# Patient Record
Sex: FEMALE | Race: WHITE | NOT HISPANIC OR LATINO | Employment: OTHER | URBAN - METROPOLITAN AREA
[De-identification: names, ages, dates, MRNs, and addresses within clinical notes are randomized per-mention and may not be internally consistent; named-entity substitution may affect disease eponyms.]

---

## 2017-01-18 ENCOUNTER — TRANSCRIBE ORDERS (OUTPATIENT)
Dept: ADMINISTRATIVE | Facility: HOSPITAL | Age: 57
End: 2017-01-18

## 2017-01-18 DIAGNOSIS — Z12.31 VISIT FOR SCREENING MAMMOGRAM: Primary | ICD-10-CM

## 2017-02-01 ENCOUNTER — APPOINTMENT (OUTPATIENT)
Dept: MAMMOGRAPHY | Facility: HOSPITAL | Age: 57
End: 2017-02-01

## 2017-02-01 ENCOUNTER — HOSPITAL ENCOUNTER (OUTPATIENT)
Dept: MAMMOGRAPHY | Facility: HOSPITAL | Age: 57
Discharge: HOME | End: 2017-02-01
Admitting: PHYSICIAN ASSISTANT

## 2017-02-01 DIAGNOSIS — Z12.31 VISIT FOR SCREENING MAMMOGRAM: ICD-10-CM

## 2017-02-01 PROCEDURE — G0202 SCR MAMMO BI INCL CAD: HCPCS

## 2017-02-01 PROCEDURE — 77063 BREAST TOMOSYNTHESIS BI: CPT

## 2017-02-01 PROCEDURE — 77067 SCR MAMMO BI INCL CAD: CPT | Performed by: RADIOLOGY

## 2017-02-01 PROCEDURE — 77063 BREAST TOMOSYNTHESIS BI: CPT | Performed by: RADIOLOGY

## 2017-02-08 ENCOUNTER — HOSPITAL ENCOUNTER (OUTPATIENT)
Dept: MAMMOGRAPHY | Facility: HOSPITAL | Age: 57
Discharge: HOME | End: 2017-02-08
Admitting: PHYSICIAN ASSISTANT

## 2017-02-08 DIAGNOSIS — R92.8 ABNORMAL MAMMOGRAM: ICD-10-CM

## 2017-02-08 PROCEDURE — 77065 DX MAMMO INCL CAD UNI: CPT | Performed by: RADIOLOGY

## 2017-02-08 PROCEDURE — G0279 TOMOSYNTHESIS, MAMMO: HCPCS

## 2017-02-08 PROCEDURE — 77061 BREAST TOMOSYNTHESIS UNI: CPT | Performed by: RADIOLOGY

## 2017-02-08 PROCEDURE — G0206 DX MAMMO INCL CAD UNI: HCPCS

## 2017-03-31 ENCOUNTER — OFFICE VISIT (OUTPATIENT)
Dept: CARDIOLOGY | Facility: CLINIC | Age: 57
End: 2017-03-31

## 2017-03-31 VITALS
DIASTOLIC BLOOD PRESSURE: 78 MMHG | SYSTOLIC BLOOD PRESSURE: 130 MMHG | WEIGHT: 234.2 LBS | HEART RATE: 96 BPM | HEIGHT: 63 IN | BODY MASS INDEX: 41.5 KG/M2

## 2017-03-31 DIAGNOSIS — E66.01 MORBID OBESITY DUE TO EXCESS CALORIES (HCC): ICD-10-CM

## 2017-03-31 DIAGNOSIS — R60.0 EDEMA OF LEFT LOWER EXTREMITY: ICD-10-CM

## 2017-03-31 DIAGNOSIS — I10 ESSENTIAL HYPERTENSION: ICD-10-CM

## 2017-03-31 DIAGNOSIS — I11.9 HYPERTENSIVE HEART DISEASE WITHOUT HEART FAILURE: Primary | ICD-10-CM

## 2017-03-31 PROCEDURE — 99213 OFFICE O/P EST LOW 20 MIN: CPT | Performed by: INTERNAL MEDICINE

## 2017-03-31 RX ORDER — CARVEDILOL 3.12 MG/1
3.12 TABLET ORAL NIGHTLY
COMMUNITY
End: 2017-06-05 | Stop reason: SDUPTHER

## 2017-03-31 NOTE — PROGRESS NOTES
Encounter Date:03/31/2017      Patient ID: Aleyda Louise is a 56 y.o. female.    Chief Complaint: No chief complaint on file.      PROBLEM LIST:  1. NYHA class II exertional dyspnea:   a. Echocardiogram 02/04/2011:  Borderline LVH with EF greater than 55%. Normal valvular morphology.  b. GXT myocardial perfusion study, 04/20/2000. No reversible ischemia. Poor exercise capacity. Normal blood pressure response to exercise. No EKG changes.   c. TTE 5/25/2016EFR 60-65%. MIld MR. Mild TR. RVSP 49. Trace [place].  2.  Morbid obesity, BMI 41.5.   3. Asthma.   4. Chronic lower extremity edema.   5. Hypothyroid.   6. GERD with Schatzki's ring.   7. Surgical history:  a.  Cholecystectomy.   b. 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.    History of Present Illness  Patient presents today for follow-up.  Since last visit ***. Denies chest pain, shortness of breath, leg swelling, palpitations, and syncope. Remains busy and active with ***.    Allergies   Allergen Reactions   • Hydrochlorothiazide      caused a 30-pound weight loss over 3-4 days     • Levaquin [Levofloxacin]    • Other      ALL DIURETICS, cause cramping.         Current Outpatient Prescriptions:   •  ARMOUR THYROID 180 MG tablet, daily., Disp: , Rfl: 11  •  carvedilol (COREG) 3.125 MG tablet, Take 3.125 mg by mouth Every Night., Disp: , Rfl:   •  omeprazole (PriLOSEC) 20 MG capsule, Take 20 mg by mouth daily., Disp: , Rfl:   •  pramipexole (MIRAPEX) 1 MG tablet, Take 2 mg by mouth Every Night., Disp: , Rfl: 3  •  tiZANidine (ZANAFLEX) 4 MG tablet, Take 4 mg by mouth As Needed., Disp: , Rfl:   •  traMADol (ULTRAM) 50 MG tablet, Take 50 mg by mouth as needed for moderate pain (4-6)., Disp: , Rfl:     The following portions of the patient's history were reviewed and updated as appropriate: allergies, current medications, past family history, past medical history, past social history, past surgical history and problem list.    Review of Systems  "  Constitution: Negative for chills, fever, weight gain and weight loss.   Cardiovascular: Negative for chest pain, claudication, dyspnea on exertion, leg swelling, orthopnea, palpitations, paroxysmal nocturnal dyspnea and syncope.        No dizziness   Gastrointestinal: Negative for abdominal pain, constipation, diarrhea, nausea and vomiting.   Genitourinary:        No urinary symptoms   Neurological:        No symptoms of stroke.   All other systems reviewed and are negative.         Objective:     Blood pressure 130/78, pulse 96, height 63\" (160 cm), weight 234 lb 3.2 oz (106 kg).  Repeat blood pressure measurement by, Kinsey Owen MD, at ***      Physical Exam   Constitutional: She appears well-developed and well-nourished.   HENT:   HEENT exam unremarkable.   Neck: Neck supple. No JVD present.   No carotid bruits.   Cardiovascular: Normal rate, regular rhythm and normal heart sounds.    No murmur heard.  2 plus symmetric pulses.   Pulmonary/Chest: Breath sounds normal. She exhibits no tenderness.   Abdominal:   Abdomen benign.   Musculoskeletal: She exhibits no edema.   Neurological:   Neurological exam unremarkable.   Vitals reviewed.      Lab Review:   Procedures       Assessment:   No diagnosis found.  Plan:     Continue current medications.   FU in 6 MO, sooner as needed.  Thank you for allowing us to participate in the care of your patient.     Scribed for Kinsey Owen MD by Tim Whitehead. 3/31/2017  12:33 PM    ***    Please note that portions of this note may have been completed with a voice recognition program. Efforts were made to edit the dictations, but occasionally words are mistranscribed.        "

## 2017-03-31 NOTE — PROGRESS NOTES
Encounter Date:03/31/2017      Patient ID: Aleyda Louise is a 56 y.o. female.    Chief Complaint: follow up Hypertensive heart disease    Problem List:  1. NYHA class II exertional dyspnea:   a. Echocardiogram 02/04/2011: Borderline LVH with EF greater than 55%. Normal valvular morphology.  b. GXT myocardial perfusion study, 04/20/2000. No reversible ischemia. Poor exercise capacity. Normal blood pressure response to exercise. No EKG changes.   c. TTE 5/25/2016 EFR 60-65%. MIld MR. Mild TR. RVSP 49. Trace [place].  2. Morbid obesity, BMI 41.5.   3. Asthma.   4. Chronic lower extremity edema.   5. Hypothyroid.   6. GERD with Schatzki's ring.   7. Surgical history:  a. Cholecystectomy.   b. 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    History of Present Illness   Mrs. Louise is a 56 yr old white female with the above noted medical history who presents for follow up of her hypertensive heart disease.  She has been trying to diet,but has been unsuccessful.   She had labs drawn in February and her TSH was high so she went off of her diet and is scheduled to have her labs rechecked next week.  She continues to be short of breath with minimal exertion. She denies chest pain, palpitations, orthopnea, PND.  She does have lower extremity edema mainly of the left leg.    Allergies   Allergen Reactions   • Hydrochlorothiazide      caused a 30-pound weight loss over 3-4 days     • Levaquin [Levofloxacin]    • Other      ALL DIURETICS, cause cramping.         Current Outpatient Prescriptions:   •  ARMOUR THYROID 180 MG tablet, daily., Disp: , Rfl: 11  •  carvedilol (COREG) 3.125 MG tablet, Take 3.125 mg by mouth Every Night., Disp: , Rfl:   •  omeprazole (PriLOSEC) 20 MG capsule, Take 20 mg by mouth daily., Disp: , Rfl:   •  pramipexole (MIRAPEX) 1 MG tablet, Take 2 mg by mouth Every Night., Disp: , Rfl: 3  •  tiZANidine (ZANAFLEX) 4 MG tablet, Take 4 mg by mouth As Needed., Disp: , Rfl:   •  traMADol (ULTRAM) 50 MG  "tablet, Take 50 mg by mouth as needed for moderate pain (4-6)., Disp: , Rfl:     The following portions of the patient's history were reviewed and updated as appropriate: allergies, current medications, past family history, past medical history, past social history, past surgical history and problem list.    Review of Systems   Constitution: Negative.   HENT: Negative.    Eyes: Negative.    Cardiovascular: Positive for dyspnea on exertion and leg swelling. Negative for chest pain, near-syncope, orthopnea, palpitations, paroxysmal nocturnal dyspnea and syncope.   Respiratory: Positive for shortness of breath.    Endocrine: Negative.    Hematologic/Lymphatic: Negative.    Skin: Negative.    Musculoskeletal: Positive for arthritis and joint pain.   Gastrointestinal: Negative.    Genitourinary: Negative.    Neurological: Negative.    Psychiatric/Behavioral: Negative.    Allergic/Immunologic: Negative.        Objective:     Blood pressure 130/78, pulse 96, height 63\" (160 cm), weight 234 lb 3.2 oz (106 kg).      Physical Exam   Constitutional: She is oriented to person, place, and time. She appears well-developed and well-nourished.   HENT:   Head: Normocephalic and atraumatic.   Neck: Normal range of motion. No thyromegaly present.   Cardiovascular: Normal rate, regular rhythm, normal heart sounds and intact distal pulses.  Exam reveals no gallop and no friction rub.    No murmur heard.  Pulmonary/Chest: Effort normal and breath sounds normal.   Abdominal: Soft. Bowel sounds are normal.   Musculoskeletal: Normal range of motion. She exhibits edema.   LLE edema   Neurological: She is alert and oriented to person, place, and time.   Skin: Skin is warm and dry.   Psychiatric: She has a normal mood and affect. Her behavior is normal. Judgment and thought content normal.   Nursing note and vitals reviewed.      Lab Review:     Procedures         Assessment:          Diagnosis Plan   1. Hypertensive heart disease without " heart failure     2. Essential hypertension     3. Morbid obesity due to excess calories     4. Edema of left lower extremity            Plan:   Compression Hose for treatment of lower extremity edema  Referral to Dr. Leyva for vein evaluation or return to see Dr. De Dios, she will side and let us know.  Continue Coreg for hypertension.   Patient is cleared from a cardiac standpoint to proceed with Bariatric Surgery is she decides to proceed.  FU in 12 MO, sooner as needed.  Thank you for allowing us to participate in the care of your patient.     Scribed for Kinsey Owen MD by Jocelyn Dunbar RN. 3/31/2017  1:02 PM     I, Kinsey Owen MD, personally performed the services described in this documentation as scribed by the above named individual in my presence, and it is both accurate and complete.  3/31/2017  1:20 PM

## 2017-04-11 ENCOUNTER — HOSPITAL ENCOUNTER (OUTPATIENT)
Dept: GENERAL RADIOLOGY | Facility: HOSPITAL | Age: 57
Discharge: HOME | End: 2017-04-11
Admitting: NURSE PRACTITIONER

## 2017-04-11 ENCOUNTER — TRANSCRIBE ORDERS (OUTPATIENT)
Dept: ADMINISTRATIVE | Facility: HOSPITAL | Age: 57
End: 2017-04-11

## 2017-04-11 DIAGNOSIS — R05.9 COUGH: Primary | ICD-10-CM

## 2017-04-11 PROCEDURE — 71020 HC CHEST PA AND LATERAL: CPT

## 2017-06-05 RX ORDER — CARVEDILOL 3.12 MG/1
3.12 TABLET ORAL NIGHTLY
Qty: 90 TABLET | Refills: 2 | Status: SHIPPED | OUTPATIENT
Start: 2017-06-05 | End: 2019-06-27

## 2017-09-11 ENCOUNTER — TELEPHONE (OUTPATIENT)
Dept: PAIN MEDICINE | Facility: CLINIC | Age: 57
End: 2017-09-11

## 2017-09-20 ENCOUNTER — TELEPHONE (OUTPATIENT)
Dept: PAIN MEDICINE | Facility: CLINIC | Age: 57
End: 2017-09-20

## 2019-05-23 RX ORDER — THYROID 180 MG
TABLET ORAL
Qty: 30 TABLET | Refills: 0 | Status: SHIPPED | OUTPATIENT
Start: 2019-05-23 | End: 2019-06-15 | Stop reason: SDUPTHER

## 2019-06-17 RX ORDER — THYROID 180 MG
TABLET ORAL
Qty: 30 TABLET | Refills: 0 | Status: SHIPPED | OUTPATIENT
Start: 2019-06-17 | End: 2019-07-03 | Stop reason: ALTCHOICE

## 2019-06-24 RX ORDER — PRAMIPEXOLE DIHYDROCHLORIDE 1 MG/1
TABLET ORAL
Qty: 180 TABLET | Refills: 2 | Status: CANCELLED | OUTPATIENT
Start: 2019-06-24

## 2019-06-25 NOTE — TELEPHONE ENCOUNTER
Electronic refill request for pramipexole.  Patient's last visit with us was 9/12/19. No future appt scheduled.  Please sign off if OK to refill.

## 2019-06-27 ENCOUNTER — OFFICE VISIT (OUTPATIENT)
Dept: INTERNAL MEDICINE | Facility: CLINIC | Age: 59
End: 2019-06-27

## 2019-06-27 ENCOUNTER — HOSPITAL ENCOUNTER (OUTPATIENT)
Dept: CT IMAGING | Facility: HOSPITAL | Age: 59
Discharge: HOME | End: 2019-06-27
Admitting: PHYSICIAN ASSISTANT

## 2019-06-27 ENCOUNTER — LAB (OUTPATIENT)
Dept: LAB | Facility: HOSPITAL | Age: 59
End: 2019-06-27

## 2019-06-27 VITALS
BODY MASS INDEX: 34.04 KG/M2 | HEIGHT: 62 IN | HEART RATE: 60 BPM | DIASTOLIC BLOOD PRESSURE: 78 MMHG | WEIGHT: 185 LBS | TEMPERATURE: 97.4 F | SYSTOLIC BLOOD PRESSURE: 122 MMHG

## 2019-06-27 DIAGNOSIS — M54.50 ACUTE BILATERAL LOW BACK PAIN WITHOUT SCIATICA: Primary | ICD-10-CM

## 2019-06-27 DIAGNOSIS — E03.8 OTHER SPECIFIED HYPOTHYROIDISM: ICD-10-CM

## 2019-06-27 DIAGNOSIS — M54.50 ACUTE LEFT-SIDED LOW BACK PAIN WITHOUT SCIATICA: ICD-10-CM

## 2019-06-27 DIAGNOSIS — M54.50 ACUTE BILATERAL LOW BACK PAIN WITHOUT SCIATICA: ICD-10-CM

## 2019-06-27 PROBLEM — J20.9 BRONCHITIS WITH BRONCHOSPASM: Status: ACTIVE | Noted: 2019-06-27

## 2019-06-27 PROBLEM — J30.9 ALLERGIC RHINITIS: Status: ACTIVE | Noted: 2019-06-27

## 2019-06-27 PROBLEM — R63.4 WEIGHT LOSS: Status: ACTIVE | Noted: 2019-06-27

## 2019-06-27 PROBLEM — E78.2 MIXED HYPERLIPIDEMIA: Status: ACTIVE | Noted: 2019-06-27

## 2019-06-27 PROBLEM — F33.42 RECURRENT MAJOR DEPRESSIVE DISORDER, IN FULL REMISSION: Status: ACTIVE | Noted: 2019-06-27

## 2019-06-27 PROBLEM — R05.9 COUGH: Status: ACTIVE | Noted: 2019-06-27

## 2019-06-27 PROBLEM — G25.81 RESTLESS LEGS SYNDROME: Status: ACTIVE | Noted: 2019-06-27

## 2019-06-27 PROBLEM — F32.A DEPRESSION: Status: ACTIVE | Noted: 2019-06-27

## 2019-06-27 PROBLEM — J20.9 ACUTE BRONCHITIS DUE TO INFECTION: Status: ACTIVE | Noted: 2019-06-27

## 2019-06-27 PROBLEM — G47.9 SLEEP DISTURBANCE: Status: ACTIVE | Noted: 2019-06-27

## 2019-06-27 PROBLEM — I10 BENIGN ESSENTIAL HYPERTENSION: Status: ACTIVE | Noted: 2019-06-27

## 2019-06-27 PROBLEM — R60.9 EDEMA: Status: ACTIVE | Noted: 2019-06-27

## 2019-06-27 LAB
ALBUMIN SERPL-MCNC: 4.2 G/DL (ref 3.5–5.2)
ALBUMIN/GLOB SERPL: 1.6 G/DL
ALP SERPL-CCNC: 95 U/L (ref 39–117)
ALT SERPL W P-5'-P-CCNC: 14 U/L (ref 1–33)
ANION GAP SERPL CALCULATED.3IONS-SCNC: 9.8 MMOL/L (ref 5–15)
AST SERPL-CCNC: 19 U/L (ref 1–32)
BASOPHILS # BLD AUTO: 0.06 10*3/MM3 (ref 0–0.2)
BASOPHILS NFR BLD AUTO: 1 % (ref 0–1.5)
BILIRUB BLD-MCNC: NEGATIVE MG/DL
BILIRUB SERPL-MCNC: 0.5 MG/DL (ref 0.2–1.2)
BUN BLD-MCNC: 21 MG/DL (ref 8–23)
BUN/CREAT SERPL: 23.6 (ref 7–25)
CALCIUM SPEC-SCNC: 9.5 MG/DL (ref 8.6–10.5)
CHLORIDE SERPL-SCNC: 106 MMOL/L (ref 98–107)
CLARITY, POC: CLEAR
CO2 SERPL-SCNC: 27.2 MMOL/L (ref 22–29)
COLOR UR: ABNORMAL
CREAT BLD-MCNC: 0.89 MG/DL (ref 0.57–1)
DEPRECATED RDW RBC AUTO: 44.4 FL (ref 37–54)
EOSINOPHIL # BLD AUTO: 0.37 10*3/MM3 (ref 0–0.4)
EOSINOPHIL NFR BLD AUTO: 6.1 % (ref 0.3–6.2)
ERYTHROCYTE [DISTWIDTH] IN BLOOD BY AUTOMATED COUNT: 12.6 % (ref 12.3–15.4)
GFR SERPL CREATININE-BSD FRML MDRD: 64 ML/MIN/1.73
GLOBULIN UR ELPH-MCNC: 2.6 GM/DL
GLUCOSE BLD-MCNC: 90 MG/DL (ref 65–99)
GLUCOSE UR STRIP-MCNC: NEGATIVE MG/DL
HCT VFR BLD AUTO: 41.7 % (ref 34–46.6)
HGB BLD-MCNC: 13.2 G/DL (ref 12–15.9)
IMM GRANULOCYTES # BLD AUTO: 0.02 10*3/MM3 (ref 0–0.05)
IMM GRANULOCYTES NFR BLD AUTO: 0.3 % (ref 0–0.5)
KETONES UR QL: NEGATIVE
LEUKOCYTE EST, POC: ABNORMAL
LYMPHOCYTES # BLD AUTO: 2.17 10*3/MM3 (ref 0.7–3.1)
LYMPHOCYTES NFR BLD AUTO: 35.5 % (ref 19.6–45.3)
MCH RBC QN AUTO: 30.4 PG (ref 26.6–33)
MCHC RBC AUTO-ENTMCNC: 31.7 G/DL (ref 31.5–35.7)
MCV RBC AUTO: 96.1 FL (ref 79–97)
MONOCYTES # BLD AUTO: 0.6 10*3/MM3 (ref 0.1–0.9)
MONOCYTES NFR BLD AUTO: 9.8 % (ref 5–12)
NEUTROPHILS # BLD AUTO: 2.89 10*3/MM3 (ref 1.7–7)
NEUTROPHILS NFR BLD AUTO: 47.3 % (ref 42.7–76)
NITRITE UR-MCNC: NEGATIVE MG/ML
NRBC BLD AUTO-RTO: 0 /100 WBC (ref 0–0.2)
PH UR: 6 [PH] (ref 5–8)
PLATELET # BLD AUTO: 249 10*3/MM3 (ref 140–450)
PMV BLD AUTO: 9.8 FL (ref 6–12)
POTASSIUM BLD-SCNC: 4.7 MMOL/L (ref 3.5–5.2)
PROT SERPL-MCNC: 6.8 G/DL (ref 6–8.5)
PROT UR STRIP-MCNC: NEGATIVE MG/DL
RBC # BLD AUTO: 4.34 10*6/MM3 (ref 3.77–5.28)
RBC # UR STRIP: NEGATIVE /UL
SODIUM BLD-SCNC: 143 MMOL/L (ref 136–145)
SP GR UR: 1.02 (ref 1–1.03)
T4 FREE SERPL-MCNC: 0.76 NG/DL (ref 0.93–1.7)
TSH SERPL DL<=0.05 MIU/L-ACNC: 6.18 MIU/ML (ref 0.27–4.2)
UROBILINOGEN UR QL: NORMAL
WBC NRBC COR # BLD: 6.11 10*3/MM3 (ref 3.4–10.8)

## 2019-06-27 PROCEDURE — 82565 ASSAY OF CREATININE: CPT

## 2019-06-27 PROCEDURE — 85025 COMPLETE CBC W/AUTO DIFF WBC: CPT

## 2019-06-27 PROCEDURE — 25010000002 IOPAMIDOL 61 % SOLUTION: Performed by: PHYSICIAN ASSISTANT

## 2019-06-27 PROCEDURE — 87086 URINE CULTURE/COLONY COUNT: CPT

## 2019-06-27 PROCEDURE — 74178 CT ABD&PLV WO CNTR FLWD CNTR: CPT

## 2019-06-27 PROCEDURE — 84443 ASSAY THYROID STIM HORMONE: CPT

## 2019-06-27 PROCEDURE — 80053 COMPREHEN METABOLIC PANEL: CPT

## 2019-06-27 PROCEDURE — 84439 ASSAY OF FREE THYROXINE: CPT

## 2019-06-27 PROCEDURE — 99214 OFFICE O/P EST MOD 30 MIN: CPT | Performed by: PHYSICIAN ASSISTANT

## 2019-06-27 PROCEDURE — 81003 URINALYSIS AUTO W/O SCOPE: CPT | Performed by: PHYSICIAN ASSISTANT

## 2019-06-27 RX ORDER — PRAMIPEXOLE DIHYDROCHLORIDE 1 MG/1
2 TABLET ORAL NIGHTLY
Qty: 180 TABLET | Refills: 3 | Status: SHIPPED | OUTPATIENT
Start: 2019-06-27 | End: 2020-07-06

## 2019-06-27 RX ADMIN — IOPAMIDOL 95 ML: 612 INJECTION, SOLUTION INTRAVENOUS at 16:02

## 2019-06-27 NOTE — PROGRESS NOTES
"Patient Care Team:  Jessica Davila PA-C as PCP - General (Internal Medicine)    Chief Complaint::   Chief Complaint   Patient presents with   • Back Pain        Subjective     HPI  Pt was skydiving May 18th and pt landed on her left hip and left back.  2 days later, she went to [facility] and noticed increasingly pain on the left side.  She states that the pain is \"compressed\" and is causing her to walk sideways.  tI has not improved.  There is an increase in pain in the past two days.  She cannot get comfortable at night.  Heat has been effective.  Has not taken advil or tylenol for pain.  She denies pain with urination or change in color.          PMH  The following portions of the patient's history were reviewed and updated as appropriate: allergies, current medications, past family history, past medical history, past social history, past surgical history and problem list.    Review of Systems:   Review of Systems   Constitutional: Positive for activity change. Negative for appetite change, chills, fatigue and fever.   HENT: Negative for congestion, ear pain and sinus pressure.    Respiratory: Negative for cough, chest tightness, shortness of breath and wheezing.    Cardiovascular: Negative for chest pain and palpitations.   Gastrointestinal: Negative for abdominal pain, blood in stool and constipation.   Endocrine: Negative for cold intolerance and heat intolerance.   Genitourinary: Positive for flank pain. Negative for dysuria and urgency.   Musculoskeletal: Positive for arthralgias, back pain and myalgias. Negative for joint swelling.   Skin: Negative for color change.   Allergic/Immunologic: Positive for environmental allergies.   Neurological: Positive for dizziness. Negative for speech difficulty and headache.   Psychiatric/Behavioral: Negative for decreased concentration. The patient is not nervous/anxious.        Vital Signs  Vitals:    06/27/19 0929   BP: 122/78   BP Location: Left arm   Patient " "Position: Sitting   Cuff Size: Large Adult   Pulse: 60   Temp: 97.4 °F (36.3 °C)   TempSrc: Temporal   Weight: 83.9 kg (185 lb)   Height: 158.5 cm (62.4\")   PainSc:   5   PainLoc: Back       Labs  Office Visit on 06/27/2019   Component Date Value Ref Range Status   • Color 06/27/2019 Dark Yellow  Yellow, Straw, Dark Yellow, Rebecca Final   • Clarity, UA 06/27/2019 Clear  Clear Final   • Specific Gravity  06/27/2019 1.020  1.005 - 1.030 Final   • pH, Urine 06/27/2019 6.0  5.0 - 8.0 Final   • Leukocytes 06/27/2019 Trace* Negative Final   • Nitrite, UA 06/27/2019 Negative  Negative Final   • Protein, POC 06/27/2019 Negative  Negative mg/dL Final   • Glucose, UA 06/27/2019 Negative  Negative, 1000 mg/dL (3+) mg/dL Final   • Ketones, UA 06/27/2019 Negative  Negative Final   • Urobilinogen, UA 06/27/2019 Normal  Normal Final   • Bilirubin 06/27/2019 Negative  Negative Final   • Blood, UA 06/27/2019 Negative  Negative Final       Imaging  All imaging:   Imaging Results (all)     None             Current Outpatient Medications:   •  ARMOUR THYROID 180 MG tablet, TAKE 1 TABLET BY MOUTH EVERY DAY, Disp: 30 tablet, Rfl: 0  •  CALCIUM PO, Take 1 tablet by mouth Daily., Disp: , Rfl:   •  Cholecalciferol (VITAMIN D3 PO), Take 1 capsule by mouth Daily., Disp: , Rfl:   •  Multiple Vitamins-Minerals (MULTIVITAMIN ADULT PO), Take 1 tablet by mouth Daily., Disp: , Rfl:   •  pramipexole (MIRAPEX) 1 MG tablet, Take 2 tablets by mouth Every Night., Disp: 180 tablet, Rfl: 3    Physical Exam:    Physical Exam   Constitutional: She appears well-developed and well-nourished.   HENT:   Head: Normocephalic and atraumatic.   Nose: Nose normal.   Mouth/Throat: Oropharynx is clear and moist.   Eyes: Conjunctivae and EOM are normal. Pupils are equal, round, and reactive to light.   Neck: Normal range of motion. Neck supple.   Cardiovascular: Normal rate, regular rhythm, normal heart sounds and intact distal pulses.   Pulmonary/Chest: Effort normal " and breath sounds normal.   Abdominal: Soft. Bowel sounds are normal.   Musculoskeletal: She exhibits tenderness. She exhibits no edema.        Arms:  Nursing note and vitals reviewed.      Procedures        Assessment/Plan   Problem List Items Addressed This Visit        Endocrine    Hypothyroid    Relevant Medications    ARMOUR THYROID 180 MG tablet    Other Relevant Orders    TSH    T4, Free      Other Visit Diagnoses     Acute bilateral low back pain without sciatica    -  Primary    Relevant Orders    POC Urinalysis Dipstick, Automated (Completed)    CT Abdomen Pelvis With & Without Contrast    Urine Culture - Urine, Urine, Clean Catch    Acute left-sided low back pain without sciatica        Relevant Orders    CBC & Differential    Comprehensive Metabolic Panel        Pt does not feel it is related to her back pain.  Concerns for injured kidney.  Return in about 2 weeks (around 7/11/2019), or if symptoms worsen or fail to improve, for Recheck.    Plan of care reviewed with patient at the conclusion of today's visit. Education was provided regarding diagnosis, management, and any prescribed or recommended OTC medications.Patient verbalizes understanding of and agreement with management plan.     Phuong Stringer PA-C

## 2019-06-28 LAB — BACTERIA SPEC AEROBE CULT: NO GROWTH

## 2019-07-01 ENCOUNTER — TELEPHONE (OUTPATIENT)
Dept: INTERNAL MEDICINE | Facility: CLINIC | Age: 59
End: 2019-07-01

## 2019-07-01 NOTE — TELEPHONE ENCOUNTER
READ LETTER BY BRADLEY NARAYAN TO PATIENT REGARDING HER THYROID RESULTS.  SHE SAID THAT SHE MAY NEED TO MAKE ADJUSTMENT ON HER THYROID MEDICATION.    PATIENT SAYS THAT TANIYA CHANGES HER THYROID DOSES.

## 2019-07-02 LAB — CREAT BLDA-MCNC: 0.8 MG/DL (ref 0.6–1.3)

## 2019-07-03 DIAGNOSIS — E03.9 HYPOTHYROIDISM, UNSPECIFIED TYPE: Primary | ICD-10-CM

## 2019-07-03 NOTE — TELEPHONE ENCOUNTER
Call pt, let her know that I increased [place] thyroid dose.  She needs to recheck level in 6 weeks.   I have placed order.

## 2019-07-09 ENCOUNTER — TELEPHONE (OUTPATIENT)
Dept: INTERNAL MEDICINE | Facility: CLINIC | Age: 59
End: 2019-07-09

## 2019-07-09 NOTE — TELEPHONE ENCOUNTER
Reason for Call: THYROID MED    Symptoms: N/A    Onset (when it began):N/.A    Have they tried anything?N/A    Other pertinent info:    PATIENT STATED THAT OUR OFFICE HAD CALLED HER.  I PUT HER PUT ON HOLD TO FIND OUT WHAT THE MESSAGE WAS, BUT SHE HUNG UP BEFORE I GOT BACK TO HER.  I CALLED HER BACK AND TOLD HER THAT  A MESSAGE WAS ON THE PORTAL, BUT THAT SHE COULD CALL BACK AND WE WOULD READ MESSAGE TO HER.

## 2019-08-30 ENCOUNTER — LAB (OUTPATIENT)
Dept: LAB | Facility: HOSPITAL | Age: 59
End: 2019-08-30

## 2019-08-30 ENCOUNTER — TELEPHONE (OUTPATIENT)
Dept: INTERNAL MEDICINE | Facility: CLINIC | Age: 59
End: 2019-08-30

## 2019-08-30 DIAGNOSIS — R30.0 DYSURIA: Primary | ICD-10-CM

## 2019-08-30 DIAGNOSIS — R30.0 DYSURIA: ICD-10-CM

## 2019-08-30 LAB
BACTERIA UR QL AUTO: ABNORMAL /HPF
BILIRUB UR QL STRIP: ABNORMAL
CLARITY UR: ABNORMAL
COLOR UR: ABNORMAL
GLUCOSE UR STRIP-MCNC: NEGATIVE MG/DL
HGB UR QL STRIP.AUTO: ABNORMAL
HYALINE CASTS UR QL AUTO: ABNORMAL /LPF
KETONES UR QL STRIP: NEGATIVE
LEUKOCYTE ESTERASE UR QL STRIP.AUTO: ABNORMAL
NITRITE UR QL STRIP: POSITIVE
PH UR STRIP.AUTO: 5.5 [PH] (ref 5–8)
PROT UR QL STRIP: ABNORMAL
RBC # UR: ABNORMAL /HPF
REF LAB TEST METHOD: ABNORMAL
SP GR UR STRIP: 1.02 (ref 1–1.03)
SQUAMOUS #/AREA URNS HPF: ABNORMAL /HPF
UROBILINOGEN UR QL STRIP: ABNORMAL
WBC UR QL AUTO: ABNORMAL /HPF

## 2019-08-30 PROCEDURE — 81001 URINALYSIS AUTO W/SCOPE: CPT

## 2019-08-30 PROCEDURE — 87086 URINE CULTURE/COLONY COUNT: CPT

## 2019-08-31 LAB — BACTERIA SPEC AEROBE CULT: NORMAL

## 2019-09-04 ENCOUNTER — TELEPHONE (OUTPATIENT)
Dept: INTERNAL MEDICINE | Facility: CLINIC | Age: 59
End: 2019-09-04

## 2019-09-05 RX ORDER — SULFAMETHOXAZOLE AND TRIMETHOPRIM 800; 160 MG/1; MG/1
1 TABLET ORAL 2 TIMES DAILY
Qty: 10 TABLET | Refills: 0 | Status: SHIPPED | OUTPATIENT
Start: 2019-09-05 | End: 2020-02-26

## 2019-09-05 NOTE — TELEPHONE ENCOUNTER
Please call pt, tell her urine culture was contaminated.   I have sent in Bactrim for 5 days.   She should call next week if not better.

## 2019-10-02 RX ORDER — ESCITALOPRAM OXALATE 10 MG/1
TABLET ORAL
Qty: 90 TABLET | Refills: 1 | Status: SHIPPED | OUTPATIENT
Start: 2019-10-02 | End: 2020-04-06 | Stop reason: SDUPTHER

## 2020-02-20 ENCOUNTER — TELEPHONE (OUTPATIENT)
Dept: INTERNAL MEDICINE | Facility: CLINIC | Age: 60
End: 2020-02-20

## 2020-02-20 NOTE — TELEPHONE ENCOUNTER
Can we cancel the 07/03/19 labs and place a new order under your name since Jessica is no longer in the office.

## 2020-02-24 NOTE — TELEPHONE ENCOUNTER
I have lvm and MyChart message for the pt to call our office to schedule an appointment with Dulce HARVEY or Phuong HOGUE.

## 2020-02-26 ENCOUNTER — OFFICE VISIT (OUTPATIENT)
Dept: INTERNAL MEDICINE | Facility: CLINIC | Age: 60
End: 2020-02-26

## 2020-02-26 VITALS
SYSTOLIC BLOOD PRESSURE: 118 MMHG | BODY MASS INDEX: 35.88 KG/M2 | DIASTOLIC BLOOD PRESSURE: 72 MMHG | HEIGHT: 62 IN | HEART RATE: 79 BPM | OXYGEN SATURATION: 98 % | WEIGHT: 195 LBS

## 2020-02-26 DIAGNOSIS — F32.A DEPRESSION, UNSPECIFIED DEPRESSION TYPE: ICD-10-CM

## 2020-02-26 DIAGNOSIS — E53.8 VITAMIN B12 DEFICIENCY: ICD-10-CM

## 2020-02-26 DIAGNOSIS — I10 ESSENTIAL HYPERTENSION: ICD-10-CM

## 2020-02-26 DIAGNOSIS — E78.2 MIXED HYPERLIPIDEMIA: ICD-10-CM

## 2020-02-26 DIAGNOSIS — Z00.00 ROUTINE MEDICAL EXAM: ICD-10-CM

## 2020-02-26 DIAGNOSIS — Z12.39 BREAST CANCER SCREENING: ICD-10-CM

## 2020-02-26 DIAGNOSIS — R30.0 DYSURIA: ICD-10-CM

## 2020-02-26 DIAGNOSIS — E03.9 HYPOTHYROIDISM, UNSPECIFIED TYPE: Primary | ICD-10-CM

## 2020-02-26 PROCEDURE — 99214 OFFICE O/P EST MOD 30 MIN: CPT | Performed by: PHYSICIAN ASSISTANT

## 2020-03-01 NOTE — ASSESSMENT & PLAN NOTE
Lipid abnormalities are improving with lifestyle modifications.  Nutritional counseling was provided.  Lipids will be reassessed in 6 months.

## 2020-03-01 NOTE — ASSESSMENT & PLAN NOTE
Hypertension is improving with lifestyle modifications.  Continue current treatment regimen.  Dietary sodium restriction.  Regular aerobic exercise.  Blood pressure will be reassessed at the next regular appointment.

## 2020-03-05 ENCOUNTER — HOSPITAL ENCOUNTER (OUTPATIENT)
Dept: MAMMOGRAPHY | Facility: HOSPITAL | Age: 60
Discharge: HOME | End: 2020-03-05
Admitting: PHYSICIAN ASSISTANT

## 2020-03-05 DIAGNOSIS — Z12.39 BREAST CANCER SCREENING: ICD-10-CM

## 2020-03-05 PROCEDURE — 77063 BREAST TOMOSYNTHESIS BI: CPT | Performed by: RADIOLOGY

## 2020-03-05 PROCEDURE — 77067 SCR MAMMO BI INCL CAD: CPT | Performed by: RADIOLOGY

## 2020-03-05 PROCEDURE — 77063 BREAST TOMOSYNTHESIS BI: CPT

## 2020-03-05 PROCEDURE — 77067 SCR MAMMO BI INCL CAD: CPT

## 2020-03-10 ENCOUNTER — TELEPHONE (OUTPATIENT)
Dept: INTERNAL MEDICINE | Facility: CLINIC | Age: 60
End: 2020-03-10

## 2020-03-10 NOTE — TELEPHONE ENCOUNTER
Spoke to patient to remind her labs ordered 2/26/2020 are overdue.   She states she will go tomorrow morning she did not know she would need fasting labs the day they were ordered.

## 2020-03-11 ENCOUNTER — OFFICE VISIT (OUTPATIENT)
Dept: OBSTETRICS AND GYNECOLOGY | Facility: CLINIC | Age: 60
End: 2020-03-11

## 2020-03-11 VITALS
HEIGHT: 62 IN | DIASTOLIC BLOOD PRESSURE: 90 MMHG | SYSTOLIC BLOOD PRESSURE: 166 MMHG | BODY MASS INDEX: 36.4 KG/M2 | WEIGHT: 197.8 LBS

## 2020-03-11 DIAGNOSIS — Z01.419 ENCOUNTER FOR GYNECOLOGICAL EXAMINATION WITHOUT ABNORMAL FINDING: ICD-10-CM

## 2020-03-11 DIAGNOSIS — Z78.0 MENOPAUSE: Primary | ICD-10-CM

## 2020-03-11 DIAGNOSIS — N30.90 RECURRENT CYSTITIS: ICD-10-CM

## 2020-03-11 PROCEDURE — 99386 PREV VISIT NEW AGE 40-64: CPT | Performed by: OBSTETRICS & GYNECOLOGY

## 2020-03-11 RX ORDER — NITROFURANTOIN 25; 75 MG/1; MG/1
CAPSULE ORAL
Qty: 25 CAPSULE | Refills: 3 | Status: SHIPPED | OUTPATIENT
Start: 2020-03-11 | End: 2021-03-18 | Stop reason: SDUPTHER

## 2020-03-11 NOTE — PROGRESS NOTES
Chief Complaint   Patient presents with   • Gynecologic Exam     annual exam/establish care       Aleyda Louise is a 59 y.o. year old  presenting to be seen for her annual exam.  This is her first gynecologic visit with me.  This patient has had one vaginal delivery.  She had an attempt at bilateral tuboplasty by Dr. Santiago, however this failed.  She has had a cholecystectomy; bilateral breast reduction surgery; and a gastric sleeve procedure.  She has no postmenopausal bleeding.  She is treated for hypertension, dyslipidemia, and hypothyroidism.  She denies bowel or urinary symptoms.  She has noted symptoms of cystitis following intercourse.    SCREENING TESTS    Year 2012   Age                         PAP                         HPV high risk                         Mammogram         benign                MARÍA score                         Breast MRI                         Lipids                         Vitamin D                         Colonoscopy                         DEXA  Frax (hip/any)                         Ovarian Screen                             She exercises regularly: yes.  She wears her seat belt: yes.  She has concerns about domestic violence: no.  She has noticed changes in height: no    GYN screening history:  · Last mammogram: was done on approximately 3/5/2020 and the result was: Birads I (Normal)..    No Additional Complaints Reported    The following portions of the patient's history were reviewed and updated as appropriate:vital signs and   She  has a past medical history of Allergy, Back pain, Disease of thyroid gland, Edema, Hypertension, Hypothyroidism, Menopause, and Restless leg syndrome.  She does not have any pertinent problems on file.  She  has a past surgical history that includes Cholecystectomy (2014); Tumor excision; Reduction mammaplasty; Knee arthroscopy  "(1985); and Gastric Sleeve.  Her family history includes Coronary artery disease (age of onset: 76) in her father; Heart attack in her father; Hypertension in her father; Lung cancer in her mother.  She  reports that she quit smoking about 20 years ago. Her smoking use included cigarettes. She has a 20.00 pack-year smoking history. She has never used smokeless tobacco. She reports that she drinks about 1.0 - 2.0 standard drinks of alcohol per week. She reports that she does not use drugs.  Current Outpatient Medications   Medication Sig Dispense Refill   • CALCIUM PO Take 1 tablet by mouth Daily.     • Cholecalciferol (VITAMIN D3 PO) Take 1 capsule by mouth Daily.     • escitalopram (LEXAPRO) 10 MG tablet TAKE 1 TABLET BY ORAL ROUTE EVERY DAY 90 tablet 1   • Multiple Vitamins-Minerals (MULTIVITAMIN ADULT PO) Take 1 tablet by mouth Daily.     • nitrofurantoin, macrocrystal-monohydrate, (MACROBID) 100 MG capsule Take one capsule with each act of intercourse 25 capsule 3   • pramipexole (MIRAPEX) 1 MG tablet Take 2 tablets by mouth Every Night. 180 tablet 3   • thyroid (ARMOUR THYROID) 240 MG tablet Take 1 tablet by mouth Daily. 90 tablet 1     No current facility-administered medications for this visit.      She is allergic to hydrochlorothiazide; levaquin [levofloxacin]; and other..    Review of Systems  A comprehensive review of systems was taken.  Constitutional: negative for fever, chills, activity change, appetite change, fatigue and unexpected weight change.  Respiratory: negative  Cardiovascular: negative  Gastrointestinal: negative  Genitourinary:negative  Musculoskeletal:negative  Behavioral/Psych: negative       /90   Ht 158.5 cm (62.4\")   Wt 89.7 kg (197 lb 12.8 oz)   LMP  (LMP Unknown)   Breastfeeding No   BMI 35.71 kg/m²     Physical Exam    General:  alert; cooperative; well developed; well nourished   Skin:  No suspicious lesions seen   Thyroid: normal to inspection and palpation   Lungs:  " clear to auscultation bilaterally   Heart:  regular rate and rhythm, S1, S2 normal, no murmur, click, rub or gallop   Breasts:  Examined in supine position  Symmetric without masses or skin dimpling  Nipples normal without inversion, lesions or discharge  There are no palpable axillary nodes  scars bilaterally   Abdomen: soft, non-tender; no masses  no umbilical or inguinal hernias are present  no hepato-splenomegaly   Pelvis: Clinical staff was present for exam  External genitalia:  normal appearance of the external genitalia including Bartholin's and [address]'s glands.  Vaginal:  normal pink mucosa without prolapse or lesions.  Cervix:  normal appearance.  Uterus:  normal size, shape and consistency. anteverted;  Adnexa:  non palpable bilaterally.  Rectal:  anus visually normal appearing. recto-vaginal exam unremarkable and confirms findings;     Lab Review   No data reviewed    Imaging  Mammogram results         ASSESSMENT  Problems Addressed this Visit        Genitourinary    Menopause - Primary      Other Visit Diagnoses     Recurrent cystitis        Relevant Medications    nitrofurantoin, macrocrystal-monohydrate, (MACROBID) 100 MG capsule    Encounter for gynecological examination without abnormal finding        Relevant Orders    Liquid-based Pap Smear, Screening              Substance History:   reports that she quit smoking about 20 years ago. Her smoking use included cigarettes. She has a 20.00 pack-year smoking history. She has never used smokeless tobacco.   reports that she drinks about 1.0 - 2.0 standard drinks of alcohol per week.   reports that she does not use drugs.    Substance use counseling is not indicated based on patient history.  The patient indicates that her alcohol intake is social, and controlled.      PLAN    Medications prescribed this encounter:    New Medications Ordered This Visit   Medications   • nitrofurantoin, macrocrystal-monohydrate, (MACROBID) 100 MG capsule     Sig: Take one  capsule with each act of intercourse     Dispense:  25 capsule     Refill:  3   · Monthly self breast assessment and annual breast imaging  · Pap test done  · Calcium, 600 mg/ Vit. D, 400 IU daily; regular weight-bearing exercise  · Follow up: 12 month(s)  *Please note that portions of this documentation may have been completed with a voice recognition program.  Efforts were made to edit this dictation, but occasional words may have been mistranscribed.       This note was electronically signed.    BILL Sandoval MD  March 11, 2020  15:28

## 2020-04-06 RX ORDER — ESCITALOPRAM OXALATE 10 MG/1
10 TABLET ORAL DAILY
Qty: 90 TABLET | Refills: 1 | Status: SHIPPED | OUTPATIENT
Start: 2020-04-06 | End: 2021-09-25 | Stop reason: SDUPTHER

## 2020-07-06 RX ORDER — PRAMIPEXOLE DIHYDROCHLORIDE 1 MG/1
2 TABLET ORAL NIGHTLY
Qty: 180 TABLET | Refills: 3 | Status: SHIPPED | OUTPATIENT
Start: 2020-07-06 | End: 2021-09-07

## 2020-08-24 ENCOUNTER — LAB (OUTPATIENT)
Dept: LAB | Facility: HOSPITAL | Age: 60
End: 2020-08-24

## 2020-08-24 DIAGNOSIS — E53.8 VITAMIN B12 DEFICIENCY: ICD-10-CM

## 2020-08-24 DIAGNOSIS — E78.2 MIXED HYPERLIPIDEMIA: ICD-10-CM

## 2020-08-24 DIAGNOSIS — E03.9 HYPOTHYROIDISM, UNSPECIFIED TYPE: ICD-10-CM

## 2020-08-24 DIAGNOSIS — F32.A DEPRESSION, UNSPECIFIED DEPRESSION TYPE: ICD-10-CM

## 2020-08-24 DIAGNOSIS — I10 ESSENTIAL HYPERTENSION: ICD-10-CM

## 2020-08-24 LAB
25(OH)D3 SERPL-MCNC: 56.6 NG/ML (ref 30–100)
ALBUMIN SERPL-MCNC: 4.4 G/DL (ref 3.5–5.2)
ALBUMIN UR-MCNC: <1.2 MG/DL
ALBUMIN/GLOB SERPL: 1.7 G/DL
ALP SERPL-CCNC: 92 U/L (ref 39–117)
ALT SERPL W P-5'-P-CCNC: 15 U/L (ref 1–33)
ANION GAP SERPL CALCULATED.3IONS-SCNC: 10.5 MMOL/L (ref 5–15)
AST SERPL-CCNC: 15 U/L (ref 1–32)
BASOPHILS # BLD AUTO: 0.04 10*3/MM3 (ref 0–0.2)
BASOPHILS NFR BLD AUTO: 0.6 % (ref 0–1.5)
BILIRUB SERPL-MCNC: 0.3 MG/DL (ref 0–1.2)
BUN SERPL-MCNC: 20 MG/DL (ref 8–23)
BUN/CREAT SERPL: 24.4 (ref 7–25)
CALCIUM SPEC-SCNC: 9 MG/DL (ref 8.6–10.5)
CHLORIDE SERPL-SCNC: 103 MMOL/L (ref 98–107)
CHOLEST SERPL-MCNC: 225 MG/DL (ref 0–200)
CO2 SERPL-SCNC: 26.5 MMOL/L (ref 22–29)
CREAT SERPL-MCNC: 0.82 MG/DL (ref 0.57–1)
DEPRECATED RDW RBC AUTO: 44.2 FL (ref 37–54)
EOSINOPHIL # BLD AUTO: 0.38 10*3/MM3 (ref 0–0.4)
EOSINOPHIL NFR BLD AUTO: 5.9 % (ref 0.3–6.2)
ERYTHROCYTE [DISTWIDTH] IN BLOOD BY AUTOMATED COUNT: 12.9 % (ref 12.3–15.4)
GFR SERPL CREATININE-BSD FRML MDRD: 71 ML/MIN/1.73
GLOBULIN UR ELPH-MCNC: 2.6 GM/DL
GLUCOSE SERPL-MCNC: 88 MG/DL (ref 65–99)
HCT VFR BLD AUTO: 42.1 % (ref 34–46.6)
HDLC SERPL-MCNC: 61 MG/DL (ref 40–60)
HGB BLD-MCNC: 13.6 G/DL (ref 12–15.9)
IMM GRANULOCYTES # BLD AUTO: 0.03 10*3/MM3 (ref 0–0.05)
IMM GRANULOCYTES NFR BLD AUTO: 0.5 % (ref 0–0.5)
LDLC SERPL CALC-MCNC: 142 MG/DL (ref 0–100)
LDLC/HDLC SERPL: 2.33 {RATIO}
LYMPHOCYTES # BLD AUTO: 1.94 10*3/MM3 (ref 0.7–3.1)
LYMPHOCYTES NFR BLD AUTO: 30 % (ref 19.6–45.3)
MCH RBC QN AUTO: 30.1 PG (ref 26.6–33)
MCHC RBC AUTO-ENTMCNC: 32.3 G/DL (ref 31.5–35.7)
MCV RBC AUTO: 93.1 FL (ref 79–97)
MONOCYTES # BLD AUTO: 0.61 10*3/MM3 (ref 0.1–0.9)
MONOCYTES NFR BLD AUTO: 9.4 % (ref 5–12)
NEUTROPHILS NFR BLD AUTO: 3.47 10*3/MM3 (ref 1.7–7)
NEUTROPHILS NFR BLD AUTO: 53.6 % (ref 42.7–76)
NRBC BLD AUTO-RTO: 0 /100 WBC (ref 0–0.2)
PLATELET # BLD AUTO: 264 10*3/MM3 (ref 140–450)
PMV BLD AUTO: 9.7 FL (ref 6–12)
POTASSIUM SERPL-SCNC: 3.9 MMOL/L (ref 3.5–5.2)
PROT SERPL-MCNC: 7 G/DL (ref 6–8.5)
RBC # BLD AUTO: 4.52 10*6/MM3 (ref 3.77–5.28)
SODIUM SERPL-SCNC: 140 MMOL/L (ref 136–145)
T4 FREE SERPL-MCNC: 0.8 NG/DL (ref 0.93–1.7)
TRIGL SERPL-MCNC: 108 MG/DL (ref 0–150)
TSH SERPL DL<=0.05 MIU/L-ACNC: 6.02 UIU/ML (ref 0.27–4.2)
VIT B12 BLD-MCNC: 503 PG/ML (ref 211–946)
VLDLC SERPL-MCNC: 21.6 MG/DL (ref 5–40)
WBC # BLD AUTO: 6.47 10*3/MM3 (ref 3.4–10.8)

## 2020-08-24 PROCEDURE — 82043 UR ALBUMIN QUANTITATIVE: CPT

## 2020-08-24 PROCEDURE — 80053 COMPREHEN METABOLIC PANEL: CPT

## 2020-08-24 PROCEDURE — 80061 LIPID PANEL: CPT

## 2020-08-24 PROCEDURE — 82607 VITAMIN B-12: CPT

## 2020-08-24 PROCEDURE — 84443 ASSAY THYROID STIM HORMONE: CPT

## 2020-08-24 PROCEDURE — 84439 ASSAY OF FREE THYROXINE: CPT

## 2020-08-24 PROCEDURE — 82306 VITAMIN D 25 HYDROXY: CPT

## 2020-08-24 PROCEDURE — 85025 COMPLETE CBC W/AUTO DIFF WBC: CPT

## 2020-08-27 ENCOUNTER — OFFICE VISIT (OUTPATIENT)
Dept: INTERNAL MEDICINE | Facility: CLINIC | Age: 60
End: 2020-08-27

## 2020-08-27 VITALS
BODY MASS INDEX: 36.47 KG/M2 | HEART RATE: 72 BPM | TEMPERATURE: 97.1 F | WEIGHT: 198.2 LBS | DIASTOLIC BLOOD PRESSURE: 88 MMHG | SYSTOLIC BLOOD PRESSURE: 138 MMHG | HEIGHT: 62 IN

## 2020-08-27 DIAGNOSIS — G47.9 SLEEP DISTURBANCE: ICD-10-CM

## 2020-08-27 DIAGNOSIS — G25.81 RESTLESS LEGS SYNDROME: ICD-10-CM

## 2020-08-27 DIAGNOSIS — M21.611 BILATERAL BUNIONS: ICD-10-CM

## 2020-08-27 DIAGNOSIS — M21.612 BILATERAL BUNIONS: ICD-10-CM

## 2020-08-27 DIAGNOSIS — E03.9 ACQUIRED HYPOTHYROIDISM: ICD-10-CM

## 2020-08-27 DIAGNOSIS — I10 BENIGN ESSENTIAL HYPERTENSION: Primary | ICD-10-CM

## 2020-08-27 DIAGNOSIS — F51.01 PRIMARY INSOMNIA: ICD-10-CM

## 2020-08-27 DIAGNOSIS — E78.2 MIXED HYPERLIPIDEMIA: ICD-10-CM

## 2020-08-27 DIAGNOSIS — F33.42 RECURRENT MAJOR DEPRESSIVE DISORDER, IN FULL REMISSION (HCC): ICD-10-CM

## 2020-08-27 PROCEDURE — 99214 OFFICE O/P EST MOD 30 MIN: CPT | Performed by: PHYSICIAN ASSISTANT

## 2020-08-27 RX ORDER — TIZANIDINE 4 MG/1
4 TABLET ORAL NIGHTLY PRN
COMMUNITY

## 2020-08-27 RX ORDER — LEVOCETIRIZINE DIHYDROCHLORIDE 5 MG/1
5 TABLET, FILM COATED ORAL NIGHTLY PRN
COMMUNITY

## 2020-08-27 NOTE — PROGRESS NOTES
Patient Care Team:  Phuong Stringer PA-C as PCP - General (Internal Medicine)  Phuong Stringer PA-C as Physician Assistant (Internal Medicine)  Benito Sandoval MD as Consulting Physician (Gynecology)    Chief Complaint::   Chief Complaint   Patient presents with   • Hyperlipidemia   • Hypertension   • Hypothyroidism        Subjective     HPI  Bruce is a 60 year old female presents for follow up on hyperlipidemia, hypertension, and hypothyroidism.  She has recently retired in February.  She is having difficulty sleeping because her  gets up for work at 3am and she gets up to help him get ready. She does not got to sleep at the same time every night, and does not sleep solid. She wakes up often.  She used to go to "[employer] three times a week, but is currently not doing any regular exercise.  Recent labs performed on 8/24/2020 showed vitamin D of 56.6, TSH 6.020 and free T4 at 0.80, and total cholesterol at 225 with triglycerides 108, HDL 61, and LDL up to 142.  She is not consistent in taking her [place] Thyroid.  Does not want to take a statin due to restless leg and had a history of muscle cramps.  LDL cholesterol has risen 20 points.          The following portions of the patient's history were reviewed and updated as appropriate: active problem list, medication list, allergies, family history, social history    Review of Systems:   Review of Systems   Constitutional: Negative for activity change, appetite change, chills, diaphoresis, fatigue, fever, unexpected weight gain and unexpected weight loss.   HENT: Negative for congestion, ear pain, hearing loss and sinus pressure.    Eyes: Negative for visual disturbance.   Respiratory: Negative for cough, chest tightness, shortness of breath and wheezing.    Cardiovascular: Negative for chest pain, palpitations and leg swelling.   Gastrointestinal: Negative for abdominal pain, blood in stool, constipation, GERD and indigestion.   Endocrine:  "Negative for cold intolerance and heat intolerance.   Genitourinary: Negative for dysuria and hematuria.   Musculoskeletal: Negative for arthralgias and myalgias.   Skin: Negative for color change and skin lesions.   Allergic/Immunologic: Negative for environmental allergies.   Neurological: Negative for dizziness, tremors, seizures, syncope, speech difficulty, weakness, headache, memory problem and confusion.   Hematological: Does not bruise/bleed easily.   Psychiatric/Behavioral: Negative for decreased concentration, sleep disturbance and depressed mood. The patient is not nervous/anxious.        Vital Signs  Vitals:    08/27/20 0836 08/27/20 1443   BP: 146/84 138/88   BP Location: Right arm    Patient Position: Sitting    Cuff Size: Adult    Pulse: 72    Temp: 97.1 °F (36.2 °C)    Weight: 89.9 kg (198 lb 3.2 oz)    Height: 158.5 cm (62.4\")    PainSc: 0-No pain      Body mass index is 35.79 kg/m².    Labs  Lab on 08/24/2020   Component Date Value Ref Range Status   • Glucose 08/24/2020 88  65 - 99 mg/dL Final   • BUN 08/24/2020 20  8 - 23 mg/dL Final   • Creatinine 08/24/2020 0.82  0.57 - 1.00 mg/dL Final   • Sodium 08/24/2020 140  136 - 145 mmol/L Final   • Potassium 08/24/2020 3.9  3.5 - 5.2 mmol/L Final   • Chloride 08/24/2020 103  98 - 107 mmol/L Final   • CO2 08/24/2020 26.5  22.0 - 29.0 mmol/L Final   • Calcium 08/24/2020 9.0  8.6 - 10.5 mg/dL Final   • Total Protein 08/24/2020 7.0  6.0 - 8.5 g/dL Final   • Albumin 08/24/2020 4.40  3.50 - 5.20 g/dL Final   • ALT (SGPT) 08/24/2020 15  1 - 33 U/L Final   • AST (SGOT) 08/24/2020 15  1 - 32 U/L Final   • Alkaline Phosphatase 08/24/2020 92  39 - 117 U/L Final   • Total Bilirubin 08/24/2020 0.3  0.0 - 1.2 mg/dL Final   • eGFR Non  Amer 08/24/2020 71  >60 mL/min/1.73 Final   • Globulin 08/24/2020 2.6  gm/dL Final   • A/G Ratio 08/24/2020 1.7  g/dL Final   • BUN/Creatinine Ratio 08/24/2020 24.4  7.0 - 25.0 Final   • Anion Gap 08/24/2020 10.5  5.0 - 15.0 " mmol/L Final   • Total Cholesterol 08/24/2020 225* 0 - 200 mg/dL Final   • Triglycerides 08/24/2020 108  0 - 150 mg/dL Final   • HDL Cholesterol 08/24/2020 61* 40 - 60 mg/dL Final   • LDL Cholesterol  08/24/2020 142* 0 - 100 mg/dL Final   • VLDL Cholesterol 08/24/2020 21.6  5 - 40 mg/dL Final   • LDL/HDL Ratio 08/24/2020 2.33   Final   • TSH 08/24/2020 6.020* 0.270 - 4.200 uIU/mL Final   • Free T4 08/24/2020 0.80* 0.93 - 1.70 ng/dL Final   • Vitamin B-12 08/24/2020 503  211 - 946 pg/mL Final   • 25 Hydroxy, Vitamin D 08/24/2020 56.6  30.0 - 100.0 ng/ml Final   • Microalbumin, Urine 08/24/2020 <1.2  mg/dL Final   • WBC 08/24/2020 6.47  3.40 - 10.80 10*3/mm3 Final   • RBC 08/24/2020 4.52  3.77 - 5.28 10*6/mm3 Final   • Hemoglobin 08/24/2020 13.6  12.0 - 15.9 g/dL Final   • Hematocrit 08/24/2020 42.1  34.0 - 46.6 % Final   • MCV 08/24/2020 93.1  79.0 - 97.0 fL Final   • MCH 08/24/2020 30.1  26.6 - 33.0 pg Final   • MCHC 08/24/2020 32.3  31.5 - 35.7 g/dL Final   • RDW 08/24/2020 12.9  12.3 - 15.4 % Final   • RDW-SD 08/24/2020 44.2  37.0 - 54.0 fl Final   • MPV 08/24/2020 9.7  6.0 - 12.0 fL Final   • Platelets 08/24/2020 264  140 - 450 10*3/mm3 Final   • Neutrophil % 08/24/2020 53.6  42.7 - 76.0 % Final   • Lymphocyte % 08/24/2020 30.0  19.6 - 45.3 % Final   • Monocyte % 08/24/2020 9.4  5.0 - 12.0 % Final   • Eosinophil % 08/24/2020 5.9  0.3 - 6.2 % Final   • Basophil % 08/24/2020 0.6  0.0 - 1.5 % Final   • Immature Grans % 08/24/2020 0.5  0.0 - 0.5 % Final   • Neutrophils, Absolute 08/24/2020 3.47  1.70 - 7.00 10*3/mm3 Final   • Lymphocytes, Absolute 08/24/2020 1.94  0.70 - 3.10 10*3/mm3 Final   • Monocytes, Absolute 08/24/2020 0.61  0.10 - 0.90 10*3/mm3 Final   • Eosinophils, Absolute 08/24/2020 0.38  0.00 - 0.40 10*3/mm3 Final   • Basophils, Absolute 08/24/2020 0.04  0.00 - 0.20 10*3/mm3 Final   • Immature Grans, Absolute 08/24/2020 0.03  0.00 - 0.05 10*3/mm3 Final   • nRBC 08/24/2020 0.0  0.0 - 0.2 /100 WBC Final        Imaging  No radiology results for the last 30 days.      Current Outpatient Medications:   •  CALCIUM PO, Take 1 tablet by mouth Daily., Disp: , Rfl:   •  Cholecalciferol (VITAMIN D3 PO), Take 1 capsule by mouth Daily., Disp: , Rfl:   •  escitalopram (LEXAPRO) 10 MG tablet, Take 1 tablet by mouth Daily., Disp: 90 tablet, Rfl: 1  •  levocetirizine (XYZAL) 5 MG tablet, Take 5 mg by mouth At Night As Needed for Allergies., Disp: , Rfl:   •  Multiple Vitamins-Minerals (MULTIVITAMIN ADULT PO), Take 1 tablet by mouth Daily., Disp: , Rfl:   •  nitrofurantoin, macrocrystal-monohydrate, (MACROBID) 100 MG capsule, Take one capsule with each act of intercourse, Disp: 25 capsule, Rfl: 3  •  pramipexole (MIRAPEX) 1 MG tablet, TAKE 2 TABLETS BY MOUTH EVERY NIGHT., Disp: 180 tablet, Rfl: 3  •  thyroid ([place] Thyroid) 240 MG tablet, Take 1 tablet by mouth Daily., Disp: 90 tablet, Rfl: 1  •  tiZANidine (ZANAFLEX) 4 MG tablet, Take 4 mg by mouth At Night As Needed for Muscle Spasms., Disp: , Rfl:     Physical Exam:    Physical Exam   Constitutional: She is oriented to person, place, and time. She appears well-developed and well-nourished.   HENT:   Head: Normocephalic and atraumatic.   Right Ear: Hearing, tympanic membrane, external ear and ear canal normal.   Left Ear: Hearing, tympanic membrane, external ear and ear canal normal.   Nose: Nose normal.   Mouth/Throat: Uvula is midline, oropharynx is clear and moist and mucous membranes are normal.   Eyes: Pupils are equal, round, and reactive to light. Conjunctivae, EOM and lids are normal.   Neck: Normal range of motion and full passive range of motion without pain. Neck supple.   Cardiovascular: Normal rate, regular rhythm, normal heart sounds and intact distal pulses.   Pulmonary/Chest: Effort normal and breath sounds normal.   Abdominal: Soft. Normal appearance and bowel sounds are normal.   Musculoskeletal: Normal range of motion.   Bilateral bunions   Neurological: She  is alert and oriented to person, place, and time. She has normal strength and normal reflexes.   Skin: Skin is warm, dry and intact.   Psychiatric: She has a normal mood and affect. Her speech is normal and behavior is normal. Judgment and thought content normal. Cognition and memory are normal.   Vitals reviewed.      Procedures        Assessment/Plan   Problem List Items Addressed This Visit        Cardiovascular and Mediastinum    Benign essential hypertension - Primary    Overview     Patient encouraged to continue with weight loss.  Discussed importance of cutting back on sodium.  She is to restart exercise program, she promised with relief.  Monitor blood pressures at home.  Call if there remain 140/90.         Mixed hyperlipidemia    Overview     HDL has improved slightly to 61.  LDL has increased 20 points.  Discussed importance of a low-fat, low-cholesterol diet.  Patient is encouraged to perform regular cardiovascular exercise most days of the week.            Endocrine    Hypothyroidism    Overview     Recent TSH 6.020.  Patient is encouraged to take [place] Thyroid 240 mg every day as directed.  We will recheck this at next visit.         Relevant Medications    thyroid ([place] Thyroid) 240 MG tablet       Other    Recurrent major depressive disorder, in full remission (CMS/[facility])    Overview     Continue Lexapro 10 mg tablets every day.         Current Assessment & Plan     Psychological condition is improving with treatment.  Continue current treatment regimen.  Regular aerobic exercise.  Psychological condition  will be reassessed at the next regular appointment.         Relevant Medications    escitalopram (LEXAPRO) 10 MG tablet    Restless legs syndrome    Overview     Continue Mirapex 1 mg tablet daily.         Current Assessment & Plan     Discussed importance of regular cardiovascular exercise.         Sleep disturbance    Current Assessment & Plan     Encourage patient to try and get more routine  sleep pattern established.  Encouraged regular daily cardiovascular exercise.         Primary insomnia    Current Assessment & Plan     May use tizanidine as needed.           Other Visit Diagnoses     Bilateral bunions        Relevant Orders    Ambulatory Referral to Podiatry (Completed)          Return in about 6 months (around 2/27/2021) for Recheck.    Plan of care reviewed with patient at the conclusion of today's visit. Education was provided regarding diagnosis, management, and any prescribed or recommended OTC medications.Patient verbalizes understanding of and agreement with management plan.       Phuong Stringer PA-C    Please note that portions of this note were completed with a voice recognition program. Efforts were made to edit the dictations, but occasionally words are mistranscribed.

## 2020-08-27 NOTE — ASSESSMENT & PLAN NOTE
Encourage patient to try and get more routine sleep pattern established.  Encouraged regular daily cardiovascular exercise.

## 2020-09-26 DIAGNOSIS — M21.611 BILATERAL BUNIONS: Primary | ICD-10-CM

## 2020-09-26 DIAGNOSIS — M21.612 BILATERAL BUNIONS: Primary | ICD-10-CM

## 2020-09-28 ENCOUNTER — OFFICE VISIT (OUTPATIENT)
Dept: INTERNAL MEDICINE | Facility: CLINIC | Age: 60
End: 2020-09-28

## 2020-09-28 ENCOUNTER — HOSPITAL ENCOUNTER (OUTPATIENT)
Dept: GENERAL RADIOLOGY | Facility: HOSPITAL | Age: 60
Discharge: HOME | End: 2020-09-28
Admitting: PHYSICIAN ASSISTANT

## 2020-09-28 VITALS
TEMPERATURE: 97.7 F | OXYGEN SATURATION: 98 % | BODY MASS INDEX: 37.36 KG/M2 | HEIGHT: 62 IN | HEART RATE: 80 BPM | WEIGHT: 203 LBS | SYSTOLIC BLOOD PRESSURE: 138 MMHG | DIASTOLIC BLOOD PRESSURE: 86 MMHG

## 2020-09-28 DIAGNOSIS — M25.551 ACUTE RIGHT HIP PAIN: ICD-10-CM

## 2020-09-28 DIAGNOSIS — I10 BENIGN ESSENTIAL HYPERTENSION: ICD-10-CM

## 2020-09-28 DIAGNOSIS — M25.551 ACUTE RIGHT HIP PAIN: Primary | ICD-10-CM

## 2020-09-28 PROCEDURE — 99214 OFFICE O/P EST MOD 30 MIN: CPT | Performed by: PHYSICIAN ASSISTANT

## 2020-09-28 PROCEDURE — 73502 X-RAY EXAM HIP UNI 2-3 VIEWS: CPT

## 2020-09-28 NOTE — ASSESSMENT & PLAN NOTE
Unable to bear weight.  Xray of right hip and pelvis today.  History of previous surgery to right hip

## 2020-09-28 NOTE — PROGRESS NOTES
Patient Care Team:  Phuong Stringer PA-C as PCP - General (Internal Medicine)  Phuong Stringer PA-C as Physician Assistant (Internal Medicine)  Benito Sandoval MD as Consulting Physician (Gynecology)    Chief Complaint::   Chief Complaint   Patient presents with   • Groin Pain     Felt a pop when walking dog, had improved When she coughed last night the pain worsened        Subjective     HPI  Bruce is a 60 year old female who developed sudden right groin pain while walking dog over 2 weeks ago.  The dog pulled her in one direction, she lunged, and felt a pop and immediate pain in her right groin.  Patient reports pain when walking and weightbearing.  She rested for the past 2 weeks  and has applied ice, which has helped with the pain.  She sneezed violently last night and the pain returned.  The pain has been sharp and continuous.  She has difficulty lifting her right leg or bearing weight on the right leg.        The following portions of the patient's history were reviewed and updated as appropriate: active problem list, medication list, allergies, family history, social history    Review of Systems:   Review of Systems   Constitutional: Positive for activity change. Negative for appetite change, chills, fatigue and fever.   HENT: Negative for congestion, ear pain and sinus pressure.    Respiratory: Negative for cough, chest tightness, shortness of breath and wheezing.    Cardiovascular: Negative for chest pain and palpitations.   Gastrointestinal: Negative for abdominal pain, blood in stool and constipation.   Endocrine: Negative for cold intolerance and heat intolerance.   Musculoskeletal: Positive for arthralgias and gait problem. Negative for back pain.   Skin: Negative for color change.   Allergic/Immunologic: Negative for environmental allergies.   Neurological: Negative for dizziness, speech difficulty and headache.   Psychiatric/Behavioral: Negative for decreased concentration. The  "patient is not nervous/anxious.        Vital Signs  Vitals:    09/28/20 1515   BP: 138/86   BP Location: Left arm   Patient Position: Sitting   Cuff Size: Large Adult   Pulse: 80   Temp: 97.7 °F (36.5 °C)   SpO2: 98%   Weight: 92.1 kg (203 lb)   Height: 158.5 cm (62.4\")   PainSc:   6   PainLoc: Groin     Body mass index is 36.65 kg/m².    Labs  Lab on 08/24/2020   Component Date Value Ref Range Status   • Glucose 08/24/2020 88  65 - 99 mg/dL Final   • BUN 08/24/2020 20  8 - 23 mg/dL Final   • Creatinine 08/24/2020 0.82  0.57 - 1.00 mg/dL Final   • Sodium 08/24/2020 140  136 - 145 mmol/L Final   • Potassium 08/24/2020 3.9  3.5 - 5.2 mmol/L Final   • Chloride 08/24/2020 103  98 - 107 mmol/L Final   • CO2 08/24/2020 26.5  22.0 - 29.0 mmol/L Final   • Calcium 08/24/2020 9.0  8.6 - 10.5 mg/dL Final   • Total Protein 08/24/2020 7.0  6.0 - 8.5 g/dL Final   • Albumin 08/24/2020 4.40  3.50 - 5.20 g/dL Final   • ALT (SGPT) 08/24/2020 15  1 - 33 U/L Final   • AST (SGOT) 08/24/2020 15  1 - 32 U/L Final   • Alkaline Phosphatase 08/24/2020 92  39 - 117 U/L Final   • Total Bilirubin 08/24/2020 0.3  0.0 - 1.2 mg/dL Final   • eGFR Non  Amer 08/24/2020 71  >60 mL/min/1.73 Final   • Globulin 08/24/2020 2.6  gm/dL Final   • A/G Ratio 08/24/2020 1.7  g/dL Final   • BUN/Creatinine Ratio 08/24/2020 24.4  7.0 - 25.0 Final   • Anion Gap 08/24/2020 10.5  5.0 - 15.0 mmol/L Final   • Total Cholesterol 08/24/2020 225* 0 - 200 mg/dL Final   • Triglycerides 08/24/2020 108  0 - 150 mg/dL Final   • HDL Cholesterol 08/24/2020 61* 40 - 60 mg/dL Final   • LDL Cholesterol  08/24/2020 142* 0 - 100 mg/dL Final   • VLDL Cholesterol 08/24/2020 21.6  5 - 40 mg/dL Final   • LDL/HDL Ratio 08/24/2020 2.33   Final   • TSH 08/24/2020 6.020* 0.270 - 4.200 uIU/mL Final   • Free T4 08/24/2020 0.80* 0.93 - 1.70 ng/dL Final   • Vitamin B-12 08/24/2020 503  211 - 946 pg/mL Final   • 25 Hydroxy, Vitamin D 08/24/2020 56.6  30.0 - 100.0 ng/ml Final   • " Microalbumin, Urine 08/24/2020 <1.2  mg/dL Final   • WBC 08/24/2020 6.47  3.40 - 10.80 10*3/mm3 Final   • RBC 08/24/2020 4.52  3.77 - 5.28 10*6/mm3 Final   • Hemoglobin 08/24/2020 13.6  12.0 - 15.9 g/dL Final   • Hematocrit 08/24/2020 42.1  34.0 - 46.6 % Final   • MCV 08/24/2020 93.1  79.0 - 97.0 fL Final   • MCH 08/24/2020 30.1  26.6 - 33.0 pg Final   • MCHC 08/24/2020 32.3  31.5 - 35.7 g/dL Final   • RDW 08/24/2020 12.9  12.3 - 15.4 % Final   • RDW-SD 08/24/2020 44.2  37.0 - 54.0 fl Final   • MPV 08/24/2020 9.7  6.0 - 12.0 fL Final   • Platelets 08/24/2020 264  140 - 450 10*3/mm3 Final   • Neutrophil % 08/24/2020 53.6  42.7 - 76.0 % Final   • Lymphocyte % 08/24/2020 30.0  19.6 - 45.3 % Final   • Monocyte % 08/24/2020 9.4  5.0 - 12.0 % Final   • Eosinophil % 08/24/2020 5.9  0.3 - 6.2 % Final   • Basophil % 08/24/2020 0.6  0.0 - 1.5 % Final   • Immature Grans % 08/24/2020 0.5  0.0 - 0.5 % Final   • Neutrophils, Absolute 08/24/2020 3.47  1.70 - 7.00 10*3/mm3 Final   • Lymphocytes, Absolute 08/24/2020 1.94  0.70 - 3.10 10*3/mm3 Final   • Monocytes, Absolute 08/24/2020 0.61  0.10 - 0.90 10*3/mm3 Final   • Eosinophils, Absolute 08/24/2020 0.38  0.00 - 0.40 10*3/mm3 Final   • Basophils, Absolute 08/24/2020 0.04  0.00 - 0.20 10*3/mm3 Final   • Immature Grans, Absolute 08/24/2020 0.03  0.00 - 0.05 10*3/mm3 Final   • nRBC 08/24/2020 0.0  0.0 - 0.2 /100 WBC Final       Imaging  No radiology results for the last 30 days.      Current Outpatient Medications:   •  CALCIUM PO, Take 1 tablet by mouth Daily., Disp: , Rfl:   •  Cholecalciferol (VITAMIN D3 PO), Take 1 capsule by mouth Daily., Disp: , Rfl:   •  escitalopram (LEXAPRO) 10 MG tablet, Take 1 tablet by mouth Daily., Disp: 90 tablet, Rfl: 1  •  levocetirizine (XYZAL) 5 MG tablet, Take 5 mg by mouth At Night As Needed for Allergies., Disp: , Rfl:   •  Multiple Vitamins-Minerals (MULTIVITAMIN ADULT PO), Take 1 tablet by mouth Daily., Disp: , Rfl:   •  nitrofurantoin,  macrocrystal-monohydrate, (MACROBID) 100 MG capsule, Take one capsule with each act of intercourse, Disp: 25 capsule, Rfl: 3  •  pramipexole (MIRAPEX) 1 MG tablet, TAKE 2 TABLETS BY MOUTH EVERY NIGHT., Disp: 180 tablet, Rfl: 3  •  thyroid ([place] Thyroid) 240 MG tablet, Take 1 tablet by mouth Daily., Disp: 90 tablet, Rfl: 1  •  tiZANidine (ZANAFLEX) 4 MG tablet, Take 4 mg by mouth At Night As Needed for Muscle Spasms., Disp: , Rfl:     Physical Exam:    Physical Exam  Constitutional:       Appearance: Normal appearance. She is obese.   HENT:      Head: Normocephalic and atraumatic.   Eyes:      Extraocular Movements: Extraocular movements intact.      Conjunctiva/sclera: Conjunctivae normal.      Pupils: Pupils are equal, round, and reactive to light.   Neck:      Musculoskeletal: Normal range of motion and neck supple.   Cardiovascular:      Rate and Rhythm: Normal rate and regular rhythm.      Pulses: Normal pulses.      Heart sounds: Normal heart sounds.   Pulmonary:      Effort: Pulmonary effort is normal.      Breath sounds: Normal breath sounds.   Abdominal:      General: Abdomen is flat. Bowel sounds are normal.      Palpations: Abdomen is soft.      Hernia: No hernia is present. There is no hernia in the left inguinal area or right inguinal area.   Musculoskeletal:      Right hip: She exhibits decreased range of motion, decreased strength, tenderness and bony tenderness.        Arms:    Lymphadenopathy:      Lower Body: No right inguinal adenopathy. No left inguinal adenopathy.   Skin:     General: Skin is dry.   Neurological:      Mental Status: She is alert.         Procedures        Assessment/Plan   Problem List Items Addressed This Visit        Cardiovascular and Mediastinum    Benign essential hypertension    Overview     Patient encouraged to continue with weight loss.  Discussed importance of cutting back on sodium. Monitor blood pressures at home.              Nervous and Auditory    Acute right hip  pain - Primary    Current Assessment & Plan     Unable to bear weight.  Xray of right hip and pelvis today.  History of previous surgery to right hip         Relevant Orders    XR Hip With or Without Pelvis 2 - 3 View Right          Return if symptoms worsen or fail to improve, for Recheck.    Plan of care reviewed with patient at the conclusion of today's visit. Education was provided regarding diagnosis, management, and any prescribed or recommended OTC medications.Patient verbalizes understanding of and agreement with management plan.       Phuong Stringer PA-C    Please note that portions of this note were completed with a voice recognition program. Efforts were made to edit the dictations, but occasionally words are mistranscribed.

## 2021-03-01 ENCOUNTER — OFFICE VISIT (OUTPATIENT)
Dept: INTERNAL MEDICINE | Facility: CLINIC | Age: 61
End: 2021-03-01

## 2021-03-01 ENCOUNTER — TELEPHONE (OUTPATIENT)
Dept: INTERNAL MEDICINE | Facility: CLINIC | Age: 61
End: 2021-03-01

## 2021-03-01 ENCOUNTER — LAB (OUTPATIENT)
Dept: LAB | Facility: HOSPITAL | Age: 61
End: 2021-03-01

## 2021-03-01 VITALS
SYSTOLIC BLOOD PRESSURE: 124 MMHG | HEIGHT: 62 IN | WEIGHT: 199 LBS | HEART RATE: 72 BPM | OXYGEN SATURATION: 97 % | TEMPERATURE: 97.3 F | DIASTOLIC BLOOD PRESSURE: 83 MMHG | BODY MASS INDEX: 36.62 KG/M2

## 2021-03-01 DIAGNOSIS — E55.9 VITAMIN D DEFICIENCY: ICD-10-CM

## 2021-03-01 DIAGNOSIS — K21.9 GASTROESOPHAGEAL REFLUX DISEASE, UNSPECIFIED WHETHER ESOPHAGITIS PRESENT: ICD-10-CM

## 2021-03-01 DIAGNOSIS — I10 BENIGN ESSENTIAL HYPERTENSION: Primary | ICD-10-CM

## 2021-03-01 DIAGNOSIS — E03.9 ACQUIRED HYPOTHYROIDISM: ICD-10-CM

## 2021-03-01 DIAGNOSIS — G47.9 SLEEP DISTURBANCE: ICD-10-CM

## 2021-03-01 DIAGNOSIS — E78.2 MIXED HYPERLIPIDEMIA: ICD-10-CM

## 2021-03-01 DIAGNOSIS — E53.8 VITAMIN B12 DEFICIENCY: ICD-10-CM

## 2021-03-01 DIAGNOSIS — E66.01 MORBID (SEVERE) OBESITY DUE TO EXCESS CALORIES (HCC): ICD-10-CM

## 2021-03-01 DIAGNOSIS — J20.9 BRONCHITIS WITH BRONCHOSPASM: ICD-10-CM

## 2021-03-01 DIAGNOSIS — E66.01 MORBID OBESITY (HCC): ICD-10-CM

## 2021-03-01 DIAGNOSIS — I10 BENIGN ESSENTIAL HYPERTENSION: ICD-10-CM

## 2021-03-01 DIAGNOSIS — Z20.822 CLOSE EXPOSURE TO COVID-19 VIRUS: ICD-10-CM

## 2021-03-01 LAB
25(OH)D3 SERPL-MCNC: 52.9 NG/ML
ALBUMIN SERPL-MCNC: 4 G/DL (ref 3.5–5.2)
ALBUMIN/GLOB SERPL: 1.4 G/DL
ALP SERPL-CCNC: 88 U/L (ref 39–117)
ALT SERPL W P-5'-P-CCNC: 12 U/L (ref 1–33)
ANION GAP SERPL CALCULATED.3IONS-SCNC: 9.9 MMOL/L (ref 5–15)
AST SERPL-CCNC: 16 U/L (ref 1–32)
BASOPHILS # BLD AUTO: 0.05 10*3/MM3 (ref 0–0.2)
BASOPHILS NFR BLD AUTO: 0.5 % (ref 0–1.5)
BILIRUB SERPL-MCNC: 0.3 MG/DL (ref 0–1.2)
BUN SERPL-MCNC: 15 MG/DL (ref 8–23)
BUN/CREAT SERPL: 21.1 (ref 7–25)
CALCIUM SPEC-SCNC: 8.9 MG/DL (ref 8.6–10.5)
CHLORIDE SERPL-SCNC: 105 MMOL/L (ref 98–107)
CHOLEST SERPL-MCNC: 199 MG/DL (ref 0–200)
CO2 SERPL-SCNC: 27.1 MMOL/L (ref 22–29)
CREAT SERPL-MCNC: 0.71 MG/DL (ref 0.57–1)
DEPRECATED RDW RBC AUTO: 43.2 FL (ref 37–54)
EOSINOPHIL # BLD AUTO: 0.32 10*3/MM3 (ref 0–0.4)
EOSINOPHIL NFR BLD AUTO: 3.4 % (ref 0.3–6.2)
ERYTHROCYTE [DISTWIDTH] IN BLOOD BY AUTOMATED COUNT: 12.8 % (ref 12.3–15.4)
GFR SERPL CREATININE-BSD FRML MDRD: 83 ML/MIN/1.73
GLOBULIN UR ELPH-MCNC: 2.8 GM/DL
GLUCOSE SERPL-MCNC: 92 MG/DL (ref 65–99)
HBA1C MFR BLD: 5.88 % (ref 4.8–5.6)
HCT VFR BLD AUTO: 39.4 % (ref 34–46.6)
HDLC SERPL-MCNC: 60 MG/DL (ref 40–60)
HGB BLD-MCNC: 13.1 G/DL (ref 12–15.9)
IMM GRANULOCYTES # BLD AUTO: 0.03 10*3/MM3 (ref 0–0.05)
IMM GRANULOCYTES NFR BLD AUTO: 0.3 % (ref 0–0.5)
LDLC SERPL CALC-MCNC: 124 MG/DL (ref 0–100)
LDLC/HDLC SERPL: 2.04 {RATIO}
LYMPHOCYTES # BLD AUTO: 1.7 10*3/MM3 (ref 0.7–3.1)
LYMPHOCYTES NFR BLD AUTO: 17.9 % (ref 19.6–45.3)
MCH RBC QN AUTO: 31.1 PG (ref 26.6–33)
MCHC RBC AUTO-ENTMCNC: 33.2 G/DL (ref 31.5–35.7)
MCV RBC AUTO: 93.6 FL (ref 79–97)
MONOCYTES # BLD AUTO: 0.65 10*3/MM3 (ref 0.1–0.9)
MONOCYTES NFR BLD AUTO: 6.8 % (ref 5–12)
NEUTROPHILS NFR BLD AUTO: 6.77 10*3/MM3 (ref 1.7–7)
NEUTROPHILS NFR BLD AUTO: 71.1 % (ref 42.7–76)
NRBC BLD AUTO-RTO: 0 /100 WBC (ref 0–0.2)
PLATELET # BLD AUTO: 232 10*3/MM3 (ref 140–450)
PMV BLD AUTO: 9.7 FL (ref 6–12)
POTASSIUM SERPL-SCNC: 4.1 MMOL/L (ref 3.5–5.2)
PROT SERPL-MCNC: 6.8 G/DL (ref 6–8.5)
RBC # BLD AUTO: 4.21 10*6/MM3 (ref 3.77–5.28)
SODIUM SERPL-SCNC: 142 MMOL/L (ref 136–145)
T3FREE SERPL-MCNC: 6.45 PG/ML (ref 2–4.4)
T4 FREE SERPL-MCNC: 0.86 NG/DL (ref 0.93–1.7)
TRIGL SERPL-MCNC: 82 MG/DL (ref 0–150)
TSH SERPL DL<=0.05 MIU/L-ACNC: 8.5 UIU/ML (ref 0.27–4.2)
VIT B12 BLD-MCNC: 382 PG/ML (ref 211–946)
VLDLC SERPL-MCNC: 15 MG/DL (ref 5–40)
WBC # BLD AUTO: 9.52 10*3/MM3 (ref 3.4–10.8)

## 2021-03-01 PROCEDURE — 82043 UR ALBUMIN QUANTITATIVE: CPT

## 2021-03-01 PROCEDURE — 86769 SARS-COV-2 COVID-19 ANTIBODY: CPT

## 2021-03-01 PROCEDURE — 99214 OFFICE O/P EST MOD 30 MIN: CPT | Performed by: PHYSICIAN ASSISTANT

## 2021-03-01 PROCEDURE — 83036 HEMOGLOBIN GLYCOSYLATED A1C: CPT

## 2021-03-01 PROCEDURE — 82607 VITAMIN B-12: CPT

## 2021-03-01 PROCEDURE — 84443 ASSAY THYROID STIM HORMONE: CPT

## 2021-03-01 PROCEDURE — 36415 COLL VENOUS BLD VENIPUNCTURE: CPT

## 2021-03-01 PROCEDURE — 84439 ASSAY OF FREE THYROXINE: CPT

## 2021-03-01 PROCEDURE — 85025 COMPLETE CBC W/AUTO DIFF WBC: CPT

## 2021-03-01 PROCEDURE — 80061 LIPID PANEL: CPT

## 2021-03-01 PROCEDURE — 80053 COMPREHEN METABOLIC PANEL: CPT

## 2021-03-01 PROCEDURE — 82306 VITAMIN D 25 HYDROXY: CPT

## 2021-03-01 PROCEDURE — 84481 FREE ASSAY (FT-3): CPT

## 2021-03-01 NOTE — PATIENT INSTRUCTIONS
"BMI for Adults  What is BMI?  Body mass index (BMI) is a number that is calculated from a person's weight and height. BMI can help estimate how much of a person's weight is composed of fat. BMI does not measure body fat directly. Rather, it is an alternative to procedures that directly measure body fat, which can be difficult and expensive.  BMI can help identify people who may be at higher risk for certain medical problems.  What are BMI measurements used for?  BMI is used as a screening tool to identify possible weight problems. It helps determine whether a person is obese, overweight, a healthy weight, or underweight.  BMI is useful for:  · Identifying a weight problem that may be related to a medical condition or may increase the risk for medical problems.  · Promoting changes, such as changes in diet and exercise, to help reach a healthy weight. BMI screening can be repeated to see if these changes are working.  How is BMI calculated?  BMI involves measuring your weight in relation to your height. Both height and weight are measured, and the BMI is calculated from those numbers. This can be done either in English ([place] or metric measurements. Note that charts and online BMI calculators are available to help you find your BMI quickly and easily without having to do these calculations yourself.  To calculate your BMI in English ([place] measurements:    1. Measure your weight in pounds (lb).  2. Multiply the number of pounds by 703.  ? For example, for a person who weighs 180 lb, multiply that number by 703, which equals 126,540.  3. Measure your height in inches. Then multiply that number by itself to get a measurement called \"inches squared.\"  ? For example, for a person who is 70 inches tall, the \"inches squared\" measurement is 70 inches x 70 inches, which equals 4,900 inches squared.  4. Divide the total from step 2 (number of lb x 703) by the total from step 3 (inches squared): 126,540 ÷ 4,900 = 25.8. This is " "your BMI.  To calculate your BMI in metric measurements:  1. Measure your weight in kilograms (kg).  2. Measure your height in meters (m). Then multiply that number by itself to get a measurement called \"meters squared.\"  ? For example, for a person who is 1.75 m tall, the \"meters squared\" measurement is 1.75 m x 1.75 m, which is equal to 3.1 meters squared.  3. Divide the number of kilograms (your weight) by the meters squared number. In this example: 70 ÷ 3.1 = 22.6. This is your BMI.  What do the results mean?  BMI charts are used to identify whether you are underweight, normal weight, overweight, or obese. The following guidelines will be used:  · Underweight: BMI less than 18.5.  · Normal weight: BMI between 18.5 and 24.9.  · Overweight: BMI between 25 and 29.9.  · Obese: BMI of 30 or above.  Keep these notes in mind:  · Weight includes both fat and muscle, so someone with a muscular build, such as an athlete, may have a BMI that is higher than 24.9. In cases like these, BMI is not an accurate measure of body fat.  · To determine if excess body fat is the cause of a BMI of 25 or higher, further assessments may need to be done by a health care provider.  · BMI is usually interpreted in the same way for men and women.  Where to find more information  For more information about BMI, including tools to quickly calculate your BMI, go to these websites:  · Centers for Disease Control and Prevention: www.cdc.gov  · American Heart Association: www.heart.org  · National Heart, Lung, and Blood [place]: www.nhlbi.nih.gov  Summary  · Body mass index (BMI) is a number that is calculated from a person's weight and height.  · BMI may help estimate how much of a person's weight is composed of fat. BMI can help identify those who may be at higher risk for certain medical problems.  · BMI can be measured using English measurements or metric measurements.  · BMI charts are used to identify whether you are underweight, normal " weight, overweight, or obese.  This information is not intended to replace advice given to you by your health care provider. Make sure you discuss any questions you have with your health care provider.  Document Revised: 09/09/2020 Document Reviewed: 07/17/2020  Elsevier Patient Education © 2020 [employer]

## 2021-03-01 NOTE — PROGRESS NOTES
"Patient Care Team:  Phuong Stringer PA-C as PCP - General (Internal Medicine)  Phuong Stringer PA-C as Physician Assistant (Internal Medicine)  Benito Sandoval MD as Consulting Physician (Gynecology)  Jarod Cavazos MD as Consulting Physician (Gastroenterology)  Wali Mercedes MD as Surgeon (Vascular Surgery)    Chief Complaint::   Chief Complaint   Patient presents with   • Hypertension     f/u    • Hyperlipidemia     f/u        Subjective     HPI  Bruce is 60 year old female with history of hypertension, hyperlipidemia, and hypothyroidism.  She has recently returned from [place].  Slight improvement in sleeping.  Weight has dropped an additional 4 pounds.  She is compliant with her medications and tries to eat a low-carb carb, low-fat diet.  Last TSH was 6, we will recheck that today.  About 2 months ago, she developed \"covid-like\" symptoms including loss of taste and smell. She did not get tested for COVID.   Has had an increase in reflux.  Denies difficulty swallowing.  History of hiatal hernia repair and gastric sleeve, has scheduled follow-up with Dr. Mercedes. Drinks two shakes a day-high in protein. 30grams of protein a day.   She gets asthmatic bronchitis twice yearly.  Some breathing issues when she weighed the additional 100 pounds.  Pulmonary work-up included PFT here at this office and at [facility] [address].  She has had no formal diagnosis of asthma.  She denies chest pain, shortness of breath, cough, or palpitations.          The following portions of the patient's history were reviewed and updated as appropriate: active problem list, medication list, allergies, family history, social history    Review of Systems:   Review of Systems   Constitutional: Negative for activity change, appetite change, diaphoresis, fatigue, unexpected weight gain and unexpected weight loss.   HENT: Negative for hearing loss.    Eyes: Negative for visual disturbance.   Respiratory: " "Negative for chest tightness and shortness of breath.    Cardiovascular: Negative for chest pain, palpitations and leg swelling.   Gastrointestinal: Positive for GERD. Negative for abdominal pain, blood in stool and indigestion.   Endocrine: Negative for cold intolerance and heat intolerance.   Genitourinary: Negative for dysuria and hematuria.   Musculoskeletal: Negative for arthralgias and myalgias.   Skin: Negative for skin lesions.   Neurological: Negative for tremors, seizures, syncope, speech difficulty, weakness, headache, memory problem and confusion.   Hematological: Does not bruise/bleed easily.   Psychiatric/Behavioral: Negative for sleep disturbance and depressed mood. The patient is not nervous/anxious.        Vital Signs  Vitals:    03/01/21 0917   BP: 124/83   BP Location: Left arm   Patient Position: Sitting   Cuff Size: Adult   Pulse: 72   Temp: 97.3 °F (36.3 °C)   TempSrc: Temporal   SpO2: 97%   Weight: 90.3 kg (199 lb)   Height: 158.5 cm (62.4\")   PainSc: 0-No pain     Body mass index is 35.93 kg/m².    Labs  No visits with results within 3 Month(s) from this visit.   Latest known visit with results is:   Lab on 08/24/2020   Component Date Value Ref Range Status   • Glucose 08/24/2020 88  65 - 99 mg/dL Final   • BUN 08/24/2020 20  8 - 23 mg/dL Final   • Creatinine 08/24/2020 0.82  0.57 - 1.00 mg/dL Final   • Sodium 08/24/2020 140  136 - 145 mmol/L Final   • Potassium 08/24/2020 3.9  3.5 - 5.2 mmol/L Final   • Chloride 08/24/2020 103  98 - 107 mmol/L Final   • CO2 08/24/2020 26.5  22.0 - 29.0 mmol/L Final   • Calcium 08/24/2020 9.0  8.6 - 10.5 mg/dL Final   • Total Protein 08/24/2020 7.0  6.0 - 8.5 g/dL Final   • Albumin 08/24/2020 4.40  3.50 - 5.20 g/dL Final   • ALT (SGPT) 08/24/2020 15  1 - 33 U/L Final   • AST (SGOT) 08/24/2020 15  1 - 32 U/L Final   • Alkaline Phosphatase 08/24/2020 92  39 - 117 U/L Final   • Total Bilirubin 08/24/2020 0.3  0.0 - 1.2 mg/dL Final   • eGFR Non  Amer " 08/24/2020 71  >60 mL/min/1.73 Final   • Globulin 08/24/2020 2.6  gm/dL Final   • A/G Ratio 08/24/2020 1.7  g/dL Final   • BUN/Creatinine Ratio 08/24/2020 24.4  7.0 - 25.0 Final   • Anion Gap 08/24/2020 10.5  5.0 - 15.0 mmol/L Final   • Total Cholesterol 08/24/2020 225* 0 - 200 mg/dL Final   • Triglycerides 08/24/2020 108  0 - 150 mg/dL Final   • HDL Cholesterol 08/24/2020 61* 40 - 60 mg/dL Final   • LDL Cholesterol  08/24/2020 142* 0 - 100 mg/dL Final   • VLDL Cholesterol 08/24/2020 21.6  5 - 40 mg/dL Final   • LDL/HDL Ratio 08/24/2020 2.33   Final   • TSH 08/24/2020 6.020* 0.270 - 4.200 uIU/mL Final   • Free T4 08/24/2020 0.80* 0.93 - 1.70 ng/dL Final   • Vitamin B-12 08/24/2020 503  211 - 946 pg/mL Final   • 25 Hydroxy, Vitamin D 08/24/2020 56.6  30.0 - 100.0 ng/ml Final   • Microalbumin, Urine 08/24/2020 <1.2  mg/dL Final   • WBC 08/24/2020 6.47  3.40 - 10.80 10*3/mm3 Final   • RBC 08/24/2020 4.52  3.77 - 5.28 10*6/mm3 Final   • Hemoglobin 08/24/2020 13.6  12.0 - 15.9 g/dL Final   • Hematocrit 08/24/2020 42.1  34.0 - 46.6 % Final   • MCV 08/24/2020 93.1  79.0 - 97.0 fL Final   • MCH 08/24/2020 30.1  26.6 - 33.0 pg Final   • MCHC 08/24/2020 32.3  31.5 - 35.7 g/dL Final   • RDW 08/24/2020 12.9  12.3 - 15.4 % Final   • RDW-SD 08/24/2020 44.2  37.0 - 54.0 fl Final   • MPV 08/24/2020 9.7  6.0 - 12.0 fL Final   • Platelets 08/24/2020 264  140 - 450 10*3/mm3 Final   • Neutrophil % 08/24/2020 53.6  42.7 - 76.0 % Final   • Lymphocyte % 08/24/2020 30.0  19.6 - 45.3 % Final   • Monocyte % 08/24/2020 9.4  5.0 - 12.0 % Final   • Eosinophil % 08/24/2020 5.9  0.3 - 6.2 % Final   • Basophil % 08/24/2020 0.6  0.0 - 1.5 % Final   • Immature Grans % 08/24/2020 0.5  0.0 - 0.5 % Final   • Neutrophils, Absolute 08/24/2020 3.47  1.70 - 7.00 10*3/mm3 Final   • Lymphocytes, Absolute 08/24/2020 1.94  0.70 - 3.10 10*3/mm3 Final   • Monocytes, Absolute 08/24/2020 0.61  0.10 - 0.90 10*3/mm3 Final   • Eosinophils, Absolute 08/24/2020 0.38   0.00 - 0.40 10*3/mm3 Final   • Basophils, Absolute 08/24/2020 0.04  0.00 - 0.20 10*3/mm3 Final   • Immature Grans, Absolute 08/24/2020 0.03  0.00 - 0.05 10*3/mm3 Final   • nRBC 08/24/2020 0.0  0.0 - 0.2 /100 WBC Final       Imaging  No radiology results for the last 30 days.      Current Outpatient Medications:   •  CALCIUM PO, Take 1 tablet by mouth Daily., Disp: , Rfl:   •  Cholecalciferol (VITAMIN D3 PO), Take 1 capsule by mouth Daily., Disp: , Rfl:   •  escitalopram (LEXAPRO) 10 MG tablet, Take 1 tablet by mouth Daily., Disp: 90 tablet, Rfl: 1  •  levocetirizine (XYZAL) 5 MG tablet, Take 5 mg by mouth At Night As Needed for Allergies., Disp: , Rfl:   •  Multiple Vitamins-Minerals (MULTIVITAMIN ADULT PO), Take 1 tablet by mouth Daily., Disp: , Rfl:   •  nitrofurantoin, macrocrystal-monohydrate, (MACROBID) 100 MG capsule, Take one capsule with each act of intercourse, Disp: 25 capsule, Rfl: 3  •  pramipexole (MIRAPEX) 1 MG tablet, TAKE 2 TABLETS BY MOUTH EVERY NIGHT., Disp: 180 tablet, Rfl: 3  •  thyroid ([place] Thyroid) 240 MG tablet, Take 1 tablet by mouth Daily., Disp: 90 tablet, Rfl: 1  •  tiZANidine (ZANAFLEX) 4 MG tablet, Take 4 mg by mouth At Night As Needed for Muscle Spasms., Disp: , Rfl:     Physical Exam:    Physical Exam  Vitals signs and nursing note reviewed.   Constitutional:       Appearance: Normal appearance. She is well-developed. She is obese.   HENT:      Head: Normocephalic and atraumatic.      Right Ear: Hearing, tympanic membrane, ear canal and external ear normal.      Left Ear: Hearing, tympanic membrane, ear canal and external ear normal.      Nose: Nose normal.      Mouth/Throat:      Pharynx: Uvula midline.   Eyes:      General: Lids are normal.      Conjunctiva/sclera: Conjunctivae normal.      Pupils: Pupils are equal, round, and reactive to light.   Neck:      Musculoskeletal: Full passive range of motion without pain, normal range of motion and neck supple.   Cardiovascular:       Rate and Rhythm: Normal rate and regular rhythm.      Heart sounds: Normal heart sounds.   Pulmonary:      Effort: Pulmonary effort is normal.      Breath sounds: Normal breath sounds.   Abdominal:      General: Bowel sounds are normal.      Palpations: Abdomen is soft.   Musculoskeletal: Normal range of motion.   Skin:     General: Skin is warm and dry.   Neurological:      Mental Status: She is alert and oriented to person, place, and time.      Deep Tendon Reflexes: Reflexes are normal and symmetric.   Psychiatric:         Speech: Speech normal.         Behavior: Behavior normal.         Thought Content: Thought content normal.         Judgment: Judgment normal.         Procedures        Assessment/Plan   Problem List Items Addressed This Visit        Cardiac and Vasculature    Benign essential hypertension - Primary    Overview     Patient encouraged to continue with weight loss.  Discussed importance of cutting back on sodium. Monitor blood pressures at home.           Relevant Orders    CBC & Differential    Comprehensive Metabolic Panel    MicroAlbumin, Urine, Random - Urine, Clean Catch    Mixed hyperlipidemia    Overview     HDL has improved slightly to 61.  LDL has increased 20 points.  Discussed importance of a low-fat, low-cholesterol diet.  Patient is encouraged to perform regular cardiovascular exercise most days of the week.         Relevant Orders    Lipid Panel       Endocrine and Metabolic    Hypothyroidism    Overview     Recent TSH 6.020.  Patient is encouraged to take [place] Thyroid 240 mg every day as directed.  We will recheck this today.         Relevant Medications    thyroid ([place] Thyroid) 240 MG tablet    Other Relevant Orders    TSH    T4, Free    T3, Free    RESOLVED: Morbid obesity (CMS/HCC)    Overview     BMI 41.5            Gastrointestinal Abdominal     GERD (gastroesophageal reflux disease)    Overview     History of hiatal hernia and gastric sleeve repair.  History of Schatzki's  ring.  Will consider EGD after follow-up with Dr. Mercedes.            Pulmonary and Pneumonias    Bronchitis with bronchospasm    Overview     Has several bouts every couple years.  Usually treated with steroid and antibiotic.  No prior history of asthma.         Relevant Medications    levocetirizine (XYZAL) 5 MG tablet       Sleep    Sleep disturbance      Other Visit Diagnoses     Vitamin B12 deficiency        Relevant Orders    Vitamin B12    Vitamin D deficiency        Relevant Orders    Vitamin D 25 Hydroxy    Morbid (severe) obesity due to excess calories (CMS/HCC)        Relevant Orders    Hemoglobin A1c    Close exposure to COVID-19 virus        Relevant Orders    SARS-CoV-2 Antibodies ([facility])          Return in about 6 months (around 9/1/2021) for routine physical.    Plan of care reviewed with patient at the conclusion of today's visit. Education was provided regarding diagnosis, management, and any prescribed or recommended OTC medications.Patient verbalizes understanding of and agreement with management plan.       Phuong Stringer PA-C    Please note that portions of this note were completed with a voice recognition program. Efforts were made to edit the dictations, but occasionally words are mistranscribed.  Answers for HPI/ROS submitted by the patient on 2/22/2021   What is the primary reason for your visit?: Other  Please describe your symptoms.: Thyroid check  Have you had these symptoms before?: Yes  How long have you been having these symptoms?: Greater than 2 weeks  Please list any medications you are currently taking for this condition.: Rosemarie  Please describe any probable cause for these symptoms. : Thyroid

## 2021-03-02 LAB
ALBUMIN UR-MCNC: <1.2 MG/DL
SARS-COV-2 AB SERPL QL IA: POSITIVE

## 2021-03-18 ENCOUNTER — OFFICE VISIT (OUTPATIENT)
Dept: OBSTETRICS AND GYNECOLOGY | Facility: CLINIC | Age: 61
End: 2021-03-18

## 2021-03-18 VITALS
WEIGHT: 196.6 LBS | DIASTOLIC BLOOD PRESSURE: 90 MMHG | BODY MASS INDEX: 36.18 KG/M2 | HEIGHT: 62 IN | SYSTOLIC BLOOD PRESSURE: 170 MMHG

## 2021-03-18 DIAGNOSIS — Z78.0 MENOPAUSE: ICD-10-CM

## 2021-03-18 DIAGNOSIS — Z01.419 ENCOUNTER FOR GYNECOLOGICAL EXAMINATION WITHOUT ABNORMAL FINDING: Primary | ICD-10-CM

## 2021-03-18 DIAGNOSIS — N30.90 RECURRENT CYSTITIS: ICD-10-CM

## 2021-03-18 PROCEDURE — 99396 PREV VISIT EST AGE 40-64: CPT | Performed by: OBSTETRICS & GYNECOLOGY

## 2021-03-18 RX ORDER — NITROFURANTOIN 25; 75 MG/1; MG/1
CAPSULE ORAL
Qty: 25 CAPSULE | Refills: 2 | Status: SHIPPED | OUTPATIENT
Start: 2021-03-18 | End: 2022-07-29

## 2021-03-18 NOTE — PROGRESS NOTES
Chief Complaint   Patient presents with   • Annual Exam   • Med Refill       Aleyda Louise is a 60 y.o. year old  presenting to be seen for her annual exam.  This patient is menopausal and does not use estrogen/progestin replacement therapy.  She does have a history of recurrent cystitis and is managed with Macrobid, 100 mg with each active intercourse.  She has had no recent infections.  She has had a gastric sleeve procedure and hiatal hernia repair.  She is having recurrent reflux.  She has had a cholecystectomy.  She has had bilateral reduction mammoplasty.  She denies urinary incontinence.    SCREENING TESTS    Year 2012   Age                         PAP         Neg.                HPV high risk                         Mammogram         benign                MARÍA score                         Breast MRI                         Lipids                         Vitamin D                         Colonoscopy                         DEXA  Frax (hip/any)                         Ovarian Screen                             She exercises regularly: yes.  She wears her seat belt: yes.  She has concerns about domestic violence: no.  She has noticed changes in height: no    GYN screening history:  · Last pap: was done on approximately 3/11/2020 and the result was: normal PAP.  · Last mammogram: was done on approximately 3/5/2020 and the result was: Birads I (Normal)..    No Additional Complaints Reported    The following portions of the patient's history were reviewed and updated as appropriate:vital signs and   She  has a past medical history of Allergy, Back pain, Disease of thyroid gland, Edema, Hypertension, Hypothyroidism, Menopause, and Restless leg syndrome.  She does not have any pertinent problems on file.  She  has a past surgical history that includes Cholecystectomy (2014); Tumor excision;  Reduction mammaplasty; Knee arthroscopy (1985); and Gastric Sleeve.  Her family history includes Coronary artery disease (age of onset: 76) in her father; Heart attack in her father; Hypertension in her father; Lung cancer in her mother.  She  reports that she quit smoking about 21 years ago. Her smoking use included cigarettes. She has a 20.00 pack-year smoking history. She has never used smokeless tobacco. She reports current alcohol use of about 1.0 - 2.0 standard drinks of alcohol per week. She reports that she does not use drugs.  Current Outpatient Medications   Medication Sig Dispense Refill   • CALCIUM PO Take 1 tablet by mouth Daily.     • Cholecalciferol (VITAMIN D3 PO) Take 1 capsule by mouth Daily.     • escitalopram (LEXAPRO) 10 MG tablet Take 1 tablet by mouth Daily. 90 tablet 1   • levocetirizine (XYZAL) 5 MG tablet Take 5 mg by mouth At Night As Needed for Allergies.     • Multiple Vitamins-Minerals (MULTIVITAMIN ADULT PO) Take 1 tablet by mouth Daily.     • nitrofurantoin, macrocrystal-monohydrate, (Macrobid) 100 MG capsule Take one capsule with each act of intercourse 25 capsule 2   • pramipexole (MIRAPEX) 1 MG tablet TAKE 2 TABLETS BY MOUTH EVERY NIGHT. 180 tablet 3   • thyroid ([place] Thyroid) 240 MG tablet Take 1 tablet by mouth Daily. 90 tablet 1   • tiZANidine (ZANAFLEX) 4 MG tablet Take 4 mg by mouth At Night As Needed for Muscle Spasms.       No current facility-administered medications for this visit.     She is allergic to hydrochlorothiazide, levaquin [levofloxacin], and other..    Review of Systems  A review of systems was taken.  She denies cough, fever, shortness of breath, and loss of her sense of taste or smell  Constitutional: negative for fever, chills, activity change, appetite change, fatigue and unexpected weight change.  Respiratory: negative  Cardiovascular: negative  Gastrointestinal: positive for reflux  "symptoms  Genitourinary:negative  Musculoskeletal:negative  Behavioral/Psych: negative     Counseling/Anticipatory Guidance Discussed: nutrition, physical activity, healthy weight, immunizations, screenings and self-breast exam      /90   Ht 158.5 cm (62.4\")   Wt 89.2 kg (196 lb 9.6 oz)   LMP  (LMP Unknown)   Breastfeeding No   BMI 35.50 kg/m²     MEDICALLY INDICATED   Physical Exam    General:  alert; cooperative; well developed; well nourished   Skin:  No suspicious lesions seen   Thyroid: normal to inspection and palpation   Lungs:  breathing is unlabored  clear to auscultation bilaterally   Heart:  regular rate and rhythm, S1, S2 normal, no murmur, click, rub or gallop  normal apical impulse   Breasts:  Examined in supine position  Symmetric without masses or skin dimpling  Nipples normal without inversion, lesions or discharge  There are no palpable axillary nodes  Scars   Abdomen: soft, non-tender; no masses  no umbilical or inguinal hernias are present  no hepato-splenomegaly   Pelvis: Clinical staff was present for exam  External genitalia:  normal appearance of the external genitalia including Bartholin's and Broadway's glands.  Vaginal:  normal pink mucosa without prolapse or lesions.  Cervix:  normal appearance.  Uterus:  normal size, shape and consistency. anteverted;  Adnexa:  non palpable bilaterally.  Rectal:  anus visually normal appearing. recto-vaginal exam unremarkable and confirms findings;     Lab Review   Pap results    Imaging  Mammogram results              ASSESSMENT  Problems Addressed this Visit        Genitourinary and Reproductive     Menopause      Other Visit Diagnoses     Encounter for gynecological examination without abnormal finding    -  Primary    Recurrent cystitis        Relevant Medications    nitrofurantoin, macrocrystal-monohydrate, (Macrobid) 100 MG capsule      Diagnoses       Codes Comments    Encounter for gynecological examination without abnormal finding    -  " Primary ICD-10-CM: Z01.419  ICD-9-CM: V72.31     Menopause     ICD-10-CM: Z78.0  ICD-9-CM: 627.2     Recurrent cystitis     ICD-10-CM: N30.90  ICD-9-CM: 595.9               Substance History:   reports that she quit smoking about 21 years ago. Her smoking use included cigarettes. She has a 20.00 pack-year smoking history. She has never used smokeless tobacco.   reports current alcohol use of about 1.0 - 2.0 standard drinks of alcohol per week.   reports no history of drug use.    Substance use counseling is not indicated based on patient history.  She indicates that her alcohol intake is minimal.      PLAN    Medications prescribed this encounter:    New Medications Ordered This Visit   Medications   • nitrofurantoin, macrocrystal-monohydrate, (Macrobid) 100 MG capsule     Sig: Take one capsule with each act of intercourse     Dispense:  25 capsule     Refill:  2   · Monthly self breast assessment and annual breast imaging  · Calcium, 600 mg/ Vit. D, 400 IU daily; regular weight-bearing exercise  · Follow up: 12 month(s)  *Please note that portions of this documentation may have been completed with a voice recognition program.  Efforts were made to edit this dictation, but occasional words may have been mistranscribed.       This note was electronically signed.    BILL Sandoval MD  March 18, 2021  10:54 EDT

## 2021-05-31 ENCOUNTER — APPOINTMENT (OUTPATIENT)
Dept: CT IMAGING | Facility: HOSPITAL | Age: 61
End: 2021-05-31

## 2021-05-31 ENCOUNTER — HOSPITAL ENCOUNTER (EMERGENCY)
Facility: HOSPITAL | Age: 61
Discharge: HOME | End: 2021-05-31
Attending: EMERGENCY MEDICINE | Admitting: EMERGENCY MEDICINE

## 2021-05-31 VITALS
DIASTOLIC BLOOD PRESSURE: 77 MMHG | HEIGHT: 62 IN | RESPIRATION RATE: 18 BRPM | OXYGEN SATURATION: 96 % | SYSTOLIC BLOOD PRESSURE: 147 MMHG | WEIGHT: 189 LBS | BODY MASS INDEX: 34.78 KG/M2 | TEMPERATURE: 98 F | HEART RATE: 94 BPM

## 2021-05-31 DIAGNOSIS — M54.9 MUSCULOSKELETAL BACK PAIN: Primary | ICD-10-CM

## 2021-05-31 DIAGNOSIS — R10.9 LEFT FLANK PAIN: ICD-10-CM

## 2021-05-31 LAB
ALBUMIN SERPL-MCNC: 4.5 G/DL (ref 3.5–5.2)
ALBUMIN/GLOB SERPL: 1.6 G/DL
ALP SERPL-CCNC: 96 U/L (ref 39–117)
ALT SERPL W P-5'-P-CCNC: 18 U/L (ref 1–33)
ANION GAP SERPL CALCULATED.3IONS-SCNC: 10 MMOL/L (ref 5–15)
AST SERPL-CCNC: 22 U/L (ref 1–32)
BACTERIA UR QL AUTO: ABNORMAL /HPF
BASOPHILS # BLD AUTO: 0.05 10*3/MM3 (ref 0–0.2)
BASOPHILS NFR BLD AUTO: 0.8 % (ref 0–1.5)
BILIRUB SERPL-MCNC: 0.2 MG/DL (ref 0–1.2)
BILIRUB UR QL STRIP: NEGATIVE
BUN SERPL-MCNC: 19 MG/DL (ref 8–23)
BUN/CREAT SERPL: 28.8 (ref 7–25)
CALCIUM SPEC-SCNC: 9.8 MG/DL (ref 8.6–10.5)
CHLORIDE SERPL-SCNC: 106 MMOL/L (ref 98–107)
CLARITY UR: ABNORMAL
CO2 SERPL-SCNC: 25 MMOL/L (ref 22–29)
COLOR UR: YELLOW
CREAT SERPL-MCNC: 0.66 MG/DL (ref 0.57–1)
D-LACTATE SERPL-SCNC: 1.1 MMOL/L (ref 0.5–2)
DEPRECATED RDW RBC AUTO: 41.8 FL (ref 37–54)
EOSINOPHIL # BLD AUTO: 0.19 10*3/MM3 (ref 0–0.4)
EOSINOPHIL NFR BLD AUTO: 3 % (ref 0.3–6.2)
ERYTHROCYTE [DISTWIDTH] IN BLOOD BY AUTOMATED COUNT: 12.2 % (ref 12.3–15.4)
GFR SERPL CREATININE-BSD FRML MDRD: 90 ML/MIN/1.73
GLOBULIN UR ELPH-MCNC: 2.9 GM/DL
GLUCOSE SERPL-MCNC: 115 MG/DL (ref 65–99)
GLUCOSE UR STRIP-MCNC: NEGATIVE MG/DL
HCT VFR BLD AUTO: 43.6 % (ref 34–46.6)
HGB BLD-MCNC: 14 G/DL (ref 12–15.9)
HGB UR QL STRIP.AUTO: NEGATIVE
HOLD SPECIMEN: NORMAL
HYALINE CASTS UR QL AUTO: ABNORMAL /LPF
IMM GRANULOCYTES # BLD AUTO: 0.01 10*3/MM3 (ref 0–0.05)
IMM GRANULOCYTES NFR BLD AUTO: 0.2 % (ref 0–0.5)
KETONES UR QL STRIP: ABNORMAL
LEUKOCYTE ESTERASE UR QL STRIP.AUTO: NEGATIVE
LIPASE SERPL-CCNC: 24 U/L (ref 13–60)
LYMPHOCYTES # BLD AUTO: 1.74 10*3/MM3 (ref 0.7–3.1)
LYMPHOCYTES NFR BLD AUTO: 27 % (ref 19.6–45.3)
MCH RBC QN AUTO: 30.1 PG (ref 26.6–33)
MCHC RBC AUTO-ENTMCNC: 32.1 G/DL (ref 31.5–35.7)
MCV RBC AUTO: 93.8 FL (ref 79–97)
MONOCYTES # BLD AUTO: 0.59 10*3/MM3 (ref 0.1–0.9)
MONOCYTES NFR BLD AUTO: 9.2 % (ref 5–12)
NEUTROPHILS NFR BLD AUTO: 3.86 10*3/MM3 (ref 1.7–7)
NEUTROPHILS NFR BLD AUTO: 59.8 % (ref 42.7–76)
NITRITE UR QL STRIP: NEGATIVE
NRBC BLD AUTO-RTO: 0 /100 WBC (ref 0–0.2)
PH UR STRIP.AUTO: 7.5 [PH] (ref 5–8)
PLATELET # BLD AUTO: 302 10*3/MM3 (ref 140–450)
PMV BLD AUTO: 9.2 FL (ref 6–12)
POTASSIUM SERPL-SCNC: 4 MMOL/L (ref 3.5–5.2)
PROT SERPL-MCNC: 7.4 G/DL (ref 6–8.5)
PROT UR QL STRIP: NEGATIVE
RBC # BLD AUTO: 4.65 10*6/MM3 (ref 3.77–5.28)
RBC # UR: ABNORMAL /HPF
REF LAB TEST METHOD: ABNORMAL
SODIUM SERPL-SCNC: 141 MMOL/L (ref 136–145)
SP GR UR STRIP: 1.02 (ref 1–1.03)
SQUAMOUS #/AREA URNS HPF: ABNORMAL /HPF
UROBILINOGEN UR QL STRIP: ABNORMAL
WBC # BLD AUTO: 6.44 10*3/MM3 (ref 3.4–10.8)
WBC UR QL AUTO: ABNORMAL /HPF
WHOLE BLOOD HOLD SPECIMEN: NORMAL

## 2021-05-31 PROCEDURE — 25010000002 KETOROLAC TROMETHAMINE PER 15 MG: Performed by: NURSE PRACTITIONER

## 2021-05-31 PROCEDURE — 25010000002 ONDANSETRON PER 1 MG: Performed by: NURSE PRACTITIONER

## 2021-05-31 PROCEDURE — 99283 EMERGENCY DEPT VISIT LOW MDM: CPT

## 2021-05-31 PROCEDURE — 85025 COMPLETE CBC W/AUTO DIFF WBC: CPT | Performed by: EMERGENCY MEDICINE

## 2021-05-31 PROCEDURE — 83690 ASSAY OF LIPASE: CPT | Performed by: EMERGENCY MEDICINE

## 2021-05-31 PROCEDURE — 81001 URINALYSIS AUTO W/SCOPE: CPT | Performed by: EMERGENCY MEDICINE

## 2021-05-31 PROCEDURE — 80053 COMPREHEN METABOLIC PANEL: CPT | Performed by: EMERGENCY MEDICINE

## 2021-05-31 PROCEDURE — 74177 CT ABD & PELVIS W/CONTRAST: CPT

## 2021-05-31 PROCEDURE — 96375 TX/PRO/DX INJ NEW DRUG ADDON: CPT

## 2021-05-31 PROCEDURE — 96374 THER/PROPH/DIAG INJ IV PUSH: CPT

## 2021-05-31 PROCEDURE — 25010000002 IOPAMIDOL 61 % SOLUTION: Performed by: EMERGENCY MEDICINE

## 2021-05-31 PROCEDURE — 83605 ASSAY OF LACTIC ACID: CPT | Performed by: EMERGENCY MEDICINE

## 2021-05-31 RX ORDER — SODIUM CHLORIDE 9 MG/ML
10 INJECTION INTRAVENOUS AS NEEDED
Status: DISCONTINUED | OUTPATIENT
Start: 2021-05-31 | End: 2021-05-31 | Stop reason: HOSPADM

## 2021-05-31 RX ORDER — ONDANSETRON 2 MG/ML
4 INJECTION INTRAMUSCULAR; INTRAVENOUS ONCE
Status: COMPLETED | OUTPATIENT
Start: 2021-05-31 | End: 2021-05-31

## 2021-05-31 RX ORDER — KETOROLAC TROMETHAMINE 30 MG/ML
30 INJECTION, SOLUTION INTRAMUSCULAR; INTRAVENOUS ONCE
Status: COMPLETED | OUTPATIENT
Start: 2021-05-31 | End: 2021-05-31

## 2021-05-31 RX ADMIN — SODIUM CHLORIDE 1000 ML: 9 INJECTION, SOLUTION INTRAVENOUS at 07:50

## 2021-05-31 RX ADMIN — KETOROLAC TROMETHAMINE 30 MG: 30 INJECTION, SOLUTION INTRAMUSCULAR; INTRAVENOUS at 07:50

## 2021-05-31 RX ADMIN — IOPAMIDOL 90 ML: 612 INJECTION, SOLUTION INTRAVENOUS at 08:30

## 2021-05-31 RX ADMIN — ONDANSETRON 4 MG: 2 INJECTION INTRAMUSCULAR; INTRAVENOUS at 07:50

## 2021-09-07 RX ORDER — PRAMIPEXOLE DIHYDROCHLORIDE 1 MG/1
2 TABLET ORAL NIGHTLY
Qty: 180 TABLET | Refills: 3 | Status: SHIPPED | OUTPATIENT
Start: 2021-09-07

## 2021-09-07 NOTE — TELEPHONE ENCOUNTER
Rx Refill Note  Requested Prescriptions     Pending Prescriptions Disp Refills   • pramipexole (MIRAPEX) 1 MG tablet [Pharmacy Med Name: PRAMIPEXOLE 1 MG TABLET] 180 tablet 3     Sig: TAKE 2 TABLETS BY MOUTH EVERY NIGHT      Last office visit with prescribing clinician: 3/01/21      Next office visit with prescribing clinician: Visit date not found            Verenice Bustos LPN  09/07/21, 11:34 EDT

## 2021-09-27 DIAGNOSIS — Z12.31 ENCOUNTER FOR SCREENING MAMMOGRAM FOR MALIGNANT NEOPLASM OF BREAST: Primary | ICD-10-CM

## 2021-09-27 RX ORDER — ESCITALOPRAM OXALATE 10 MG/1
10 TABLET ORAL DAILY
Qty: 90 TABLET | Refills: 1 | Status: SHIPPED | OUTPATIENT
Start: 2021-09-27 | End: 2022-04-01

## 2021-09-27 NOTE — TELEPHONE ENCOUNTER
Rx Refill Note  Requested Prescriptions     Pending Prescriptions Disp Refills   • escitalopram (LEXAPRO) 10 MG tablet 90 tablet 1     Sig: Take 1 tablet by mouth Daily.      Last office visit with prescribing clinician: 3/1/21    Next office visit with prescribing clinician: none           Cheryl Oh RN  09/27/21, 09:36 EDT

## 2021-10-13 ENCOUNTER — HOSPITAL ENCOUNTER (OUTPATIENT)
Dept: MAMMOGRAPHY | Facility: HOSPITAL | Age: 61
Discharge: HOME | End: 2021-10-13
Admitting: PHYSICIAN ASSISTANT

## 2021-10-13 DIAGNOSIS — Z12.31 ENCOUNTER FOR SCREENING MAMMOGRAM FOR MALIGNANT NEOPLASM OF BREAST: ICD-10-CM

## 2021-10-13 PROCEDURE — 77063 BREAST TOMOSYNTHESIS BI: CPT | Performed by: RADIOLOGY

## 2021-10-13 PROCEDURE — 77067 SCR MAMMO BI INCL CAD: CPT

## 2021-10-13 PROCEDURE — 77067 SCR MAMMO BI INCL CAD: CPT | Performed by: RADIOLOGY

## 2021-10-13 PROCEDURE — 77063 BREAST TOMOSYNTHESIS BI: CPT

## 2022-02-18 RX ORDER — THYROID, PORCINE 240 MG/1
TABLET ORAL
Qty: 90 TABLET | Refills: 1 | Status: SHIPPED | OUTPATIENT
Start: 2022-02-18 | End: 2022-10-26 | Stop reason: SDUPTHER

## 2022-02-18 NOTE — TELEPHONE ENCOUNTER
Rx Refill Note  Requested Prescriptions     Pending Prescriptions Disp Refills   • [place] Thyroid 240 MG tablet [Pharmacy Med Name: [place] THYROID 240 MG TABLET] 90 tablet 1     Sig: TAKE 1 TABLET BY MOUTH EVERY DAY      Last office visit with prescribing clinician: 3/1/2021      Next office visit with prescribing clinician: Visit date not found            Jessica Freeman LPN  02/18/22, 09:14 EST

## 2022-04-01 RX ORDER — ESCITALOPRAM OXALATE 10 MG/1
TABLET ORAL
Qty: 90 TABLET | Refills: 1 | Status: SHIPPED | OUTPATIENT
Start: 2022-04-01

## 2022-04-01 NOTE — TELEPHONE ENCOUNTER
Rx Refill Note  Requested Prescriptions     Pending Prescriptions Disp Refills   • escitalopram (LEXAPRO) 10 MG tablet [Pharmacy Med Name: ESCITALOPRAM 10 MG TABLET] 90 tablet 1     Sig: TAKE 1 TABLET BY MOUTH EVERY DAY      Last office visit with prescribing clinician: 3/1/2021      Next office visit with prescribing clinician: Visit date not found            Verenice Bustos LPN  04/01/22, 09:16 EDT

## 2022-07-27 ENCOUNTER — TELEPHONE (OUTPATIENT)
Dept: INTERNAL MEDICINE | Facility: CLINIC | Age: 62
End: 2022-07-27

## 2022-07-27 NOTE — TELEPHONE ENCOUNTER
The first thing she needs to do is start taking lisinopril.  She should start immediately, continue to monitor, and follow up here in 1-2 days.

## 2022-07-27 NOTE — TELEPHONE ENCOUNTER
Patient went to the ER at [facility] in [place] last night for high blood pressure reading.  When she arrived at hospital her BP was 235/118.    Tests done were CT of head and EKG which she said were normal.  She was prescribed Lisinopril 10mg one a day and she is picking that prescription up right now.  She took her blood pressure at home this morning and got 198/86.  Having a headache but denies chest pain. Told to follow up with PCP.    No open slots, please advise.

## 2022-07-27 NOTE — TELEPHONE ENCOUNTER
Patient verbalized understanding. She has already started lisinopril. BP about an hour ago was 147/75. Patient has been scheduled with [facility] on Friday at 10:15am.

## 2022-07-29 ENCOUNTER — OFFICE VISIT (OUTPATIENT)
Dept: INTERNAL MEDICINE | Facility: CLINIC | Age: 62
End: 2022-07-29

## 2022-07-29 VITALS
WEIGHT: 180 LBS | BODY MASS INDEX: 33.13 KG/M2 | HEIGHT: 62 IN | SYSTOLIC BLOOD PRESSURE: 160 MMHG | DIASTOLIC BLOOD PRESSURE: 100 MMHG

## 2022-07-29 DIAGNOSIS — I10 BENIGN ESSENTIAL HYPERTENSION: Primary | ICD-10-CM

## 2022-07-29 PROBLEM — G47.9 SLEEP DISTURBANCE: Status: RESOLVED | Noted: 2019-06-27 | Resolved: 2022-07-29

## 2022-07-29 PROBLEM — G25.81 RESTLESS LEGS SYNDROME: Status: RESOLVED | Noted: 2019-06-27 | Resolved: 2022-07-29

## 2022-07-29 PROBLEM — J20.9 ACUTE BRONCHITIS DUE TO INFECTION: Status: RESOLVED | Noted: 2019-06-27 | Resolved: 2022-07-29

## 2022-07-29 PROBLEM — R60.9 EDEMA: Status: RESOLVED | Noted: 2019-06-27 | Resolved: 2022-07-29

## 2022-07-29 PROBLEM — R05.9 COUGH: Status: RESOLVED | Noted: 2019-06-27 | Resolved: 2022-07-29

## 2022-07-29 PROBLEM — Z98.890 HISTORY OF GASTRIC SURGERY: Status: ACTIVE | Noted: 2022-07-29

## 2022-07-29 PROCEDURE — 99213 OFFICE O/P EST LOW 20 MIN: CPT | Performed by: NURSE PRACTITIONER

## 2022-07-29 RX ORDER — LISINOPRIL 10 MG/1
10 TABLET ORAL DAILY
COMMUNITY
Start: 2022-07-27 | End: 2022-08-26 | Stop reason: SDUPTHER

## 2022-07-29 NOTE — PROGRESS NOTES
Aleyda Louise  1960  [id number]  Patient Care Team:  Phuong Stringer PA-C as PCP - General (Internal Medicine)  Phuong Stringer PA-C as Physician Assistant (Internal Medicine)  Benito Sandoval MD (Inactive) as Consulting Physician (Gynecology)  Jarod Cavazos MD as Consulting Physician (Gastroenterology)  Wali Mercedes MD as Surgeon (Vascular Surgery)    Aleyda Louise is a pleasant 62 y.o. female who presents for evaluation of No chief complaint on file.    No chief complaint on file.      HPI:   Follow up ED ([facility]) visit for hypertensive urgency.  Last weekend had persistent headache.  Tuesday evening had episodes of nausea, vomiting and sweats during the night.  Next am checked bp 230/119 on home cuff.  ED started her on lisinopril 10 mg which she started Wed, yesterday and this am.  Readings July 27 147/74 and 149/77, July 28 150/76, 135/85, 124/72 and this am 137/79 and 158/94.  Admits possibly more sodium in diet.  Stays active with swimming and yardwork but no formal exercise.    Has lost 80# in past 4 years after gastic sleeve surgery.  Lowest wt 160 then gained some back.    Had labs, EKG, head CT were ok per patient.  No prior bp meds.  Past Medical History:   Diagnosis Date   • Allergy    • Back pain    • Disease of thyroid gland    • Edema    • Hypertension    • Hypothyroidism    • Menopause    • Restless leg syndrome      Past Surgical History:   Procedure Laterality Date   • CHOLECYSTECTOMY  07/2014   • GASTRIC SLEEVE LAPAROSCOPIC     • KNEE ARTHROSCOPY  1985   • REDUCTION MAMMAPLASTY      with lift   • TUMOR EXCISION      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     Family History   Problem Relation Age of Onset   • Heart attack Father    • Coronary artery disease Father 76   • Hypertension Father    • Lung cancer Mother    • Breast cancer Neg Hx    • Ovarian cancer Neg Hx      Social History     Tobacco Use   Smoking Status Former Smoker   •  "Packs/day: 1.00   • Years: 20.00   • Pack years: 20.00   • Types: Cigarettes   • Quit date:    • Years since quittin.5   Smokeless Tobacco Never Used     Allergies   Allergen Reactions   • Hydrochlorothiazide Other (See Comments)     caused a 30-pound weight loss over 3-4 days  Kidney failure   • Levaquin [Levofloxacin] Myalgia   • Other      ALL DIURETICS, cause cramping.       Current Outpatient Medications:   •  [place] Thyroid 240 MG tablet, TAKE 1 TABLET BY MOUTH EVERY DAY, Disp: 90 tablet, Rfl: 1  •  CALCIUM PO, Take 1 tablet by mouth Daily., Disp: , Rfl:   •  Cholecalciferol (VITAMIN D3 PO), Take 1 capsule by mouth Daily., Disp: , Rfl:   •  levocetirizine (XYZAL) 5 MG tablet, Take 5 mg by mouth At Night As Needed for Allergies., Disp: , Rfl:   •  Multiple Vitamins-Minerals (MULTIVITAMIN ADULT PO), Take 1 tablet by mouth Daily., Disp: , Rfl:   •  pramipexole (MIRAPEX) 1 MG tablet, TAKE 2 TABLETS BY MOUTH EVERY NIGHT, Disp: 180 tablet, Rfl: 3  •  tiZANidine (ZANAFLEX) 4 MG tablet, Take 4 mg by mouth At Night As Needed for Muscle Spasms., Disp: , Rfl:   •  escitalopram (LEXAPRO) 10 MG tablet, TAKE 1 TABLET BY MOUTH EVERY DAY, Disp: 90 tablet, Rfl: 1  •  lisinopril (PRINIVIL,ZESTRIL) 10 MG tablet, Take 10 mg by mouth Daily., Disp: , Rfl:     Review of Systems  Review of systems was completed, and pertinent findings are noted in the HPI.  /100 (BP Location: Right arm)   Ht 157.5 cm (62\")   Wt 81.6 kg (180 lb)   LMP  (LMP Unknown)   BMI 32.92 kg/m²     Physical Exam  Constitutional:       Appearance: She is well-developed.   HENT:      Head: Normocephalic and atraumatic.      Comments: *wearing mask  Eyes:      Conjunctiva/sclera: Conjunctivae normal.      Pupils: Pupils are equal, round, and reactive to light.   Cardiovascular:      Rate and Rhythm: Normal rate and regular rhythm.      Pulses: Normal pulses.      Heart sounds: Normal heart sounds.   Pulmonary:      Effort: Pulmonary effort is " normal.      Breath sounds: Normal breath sounds.   Musculoskeletal:         General: Normal range of motion.      Cervical back: Normal range of motion and neck supple.   Skin:     General: Skin is warm and dry.   Neurological:      Mental Status: She is alert and oriented to person, place, and time.   Psychiatric:         Mood and Affect: Mood normal.         Behavior: Behavior normal.         Thought Content: Thought content normal.         Judgment: Judgment normal.         Procedures    PHQ-9 Total Score:      Assessment/Plan:  Diagnoses and all orders for this visit:    1. Benign essential hypertension (Primary)  Comments:  Continue lisinopril 10 mg daily, continue to monitor BP, low-sodium diet, increase activity, call next week with blood pressure readings       Patient Instructions   Continue medications as prescribed.  Check BP at home and keep a log for your next visit.    •In general, adults should engage in aerobic physical activity 3-4 times a week with each session lasting an average of 40 minutes.  Goal for 150 minutes per week total.  •Moderate (brisk walking or jogging) to vigorous (running or biking) physical activity is recommended.  •There are many helpful strategies for heart-healthy eating, including the DASH diet and the [employer]'s Choose My Plate.   •Patients with high blood pressure should consume no more than 2,400 mg of sodium a day, ideally reducing sodium intake to 1,500 mg a day. However, even reducing sodium intake in one's current diet by 1,000 mg each day can help lower blood pressure.    Limit alcohol consumption.    Information obtained from the American College of Cardiology and American Heart Association.      Plan of care reviewed with patient at the conclusion of today's visit. Education was provided regarding diagnosis, management and any prescribed or recommended OTC medications.  Patient verbalizes understanding of and agreement with management plan.    Return for Next  scheduled follow up.    Dictated Utilizing Dragon Dictation.    Dulce Lantigua, APRN

## 2022-07-29 NOTE — PATIENT INSTRUCTIONS
Continue medications as prescribed.  Check BP at home and keep a log for your next visit.    In general, adults should engage in aerobic physical activity 3-4 times a week with each session lasting an average of 40 minutes.  Goal for 150 minutes per week total.  Moderate (brisk walking or jogging) to vigorous (running or biking) physical activity is recommended.  There are many helpful strategies for heart-healthy eating, including the DASH diet and the [employer]'s Choose My Plate.   Patients with high blood pressure should consume no more than 2,400 mg of sodium a day, ideally reducing sodium intake to 1,500 mg a day. However, even reducing sodium intake in one's current diet by 1,000 mg each day can help lower blood pressure.    Limit alcohol consumption.    Information obtained from the American College of Cardiology and American Heart Association.

## 2022-08-26 ENCOUNTER — OFFICE VISIT (OUTPATIENT)
Dept: INTERNAL MEDICINE | Facility: CLINIC | Age: 62
End: 2022-08-26

## 2022-08-26 VITALS
TEMPERATURE: 98 F | SYSTOLIC BLOOD PRESSURE: 136 MMHG | WEIGHT: 180 LBS | BODY MASS INDEX: 33.13 KG/M2 | HEART RATE: 73 BPM | OXYGEN SATURATION: 98 % | HEIGHT: 62 IN | DIASTOLIC BLOOD PRESSURE: 80 MMHG

## 2022-08-26 DIAGNOSIS — Z00.00 ROUTINE MEDICAL EXAM: Primary | ICD-10-CM

## 2022-08-26 DIAGNOSIS — E78.2 MIXED HYPERLIPIDEMIA: ICD-10-CM

## 2022-08-26 DIAGNOSIS — E03.9 ACQUIRED HYPOTHYROIDISM: ICD-10-CM

## 2022-08-26 DIAGNOSIS — I10 BENIGN ESSENTIAL HYPERTENSION: ICD-10-CM

## 2022-08-26 DIAGNOSIS — E55.9 VITAMIN D DEFICIENCY: ICD-10-CM

## 2022-08-26 DIAGNOSIS — E53.8 VITAMIN B12 DEFICIENCY: ICD-10-CM

## 2022-08-26 PROCEDURE — 93000 ELECTROCARDIOGRAM COMPLETE: CPT | Performed by: PHYSICIAN ASSISTANT

## 2022-08-26 PROCEDURE — 99396 PREV VISIT EST AGE 40-64: CPT | Performed by: PHYSICIAN ASSISTANT

## 2022-08-26 RX ORDER — LISINOPRIL 10 MG/1
10 TABLET ORAL DAILY
Qty: 90 TABLET | Refills: 2 | Status: SHIPPED | OUTPATIENT
Start: 2022-08-26

## 2022-08-26 NOTE — ASSESSMENT & PLAN NOTE
- The patient's blood pressure is well controlled at this time. She will continue lisinopril 10 mg daily.  - I will refill the patient's lisinopril 10mg daily for 90 days.

## 2022-08-26 NOTE — PROGRESS NOTES
[facility] Internal Medicine    Aleyda Louise  1960   [id number]      Patient Care Team:  Phuong Stringer PA-C as PCP - General (Internal Medicine)  Phuong Stringer PA-C as Physician Assistant (Internal Medicine)  Benito Sandoval MD (Inactive) as Consulting Physician (Gynecology)  Jarod Cavazos MD as Consulting Physician (Gastroenterology)  Wali Mercedes MD as Surgeon (Vascular Surgery)    Chief Complaint::   Chief Complaint   Patient presents with   • Annual Exam      HPI  Aleyda Cordova is a 62-year-old female who presents today for routine annual exam.    Hypertension.  The patient has been monitoring her blood pressure at home and brings in a log of her readings. She believes her elevated blood pressure was due to stress and increased salt intake. During the time of her elevated blood pressure, she developed a headache, nausea, and vomiting. The patient checked her blood pressure and found it to be over 200 mmHg systolic and over 100 mmHg diastolic. She contacted the office at that time and was sent to the emergency room, where her blood pressure was checked from 2:00 PM to 7:00 PM. The patient was given a prescription for lisinopril 10 mg and instructed to follow up with her primary care physician. She denies any headaches, chest pain, or dizziness with lisinopril. Her blood pressure is 136/80 mmHg today. She denies any dry cough.     Sinus arrhythmia.  The patient had an electrocardiogram performed today, which showed sinus rhythm with sinus arrhythmia. She states that she had a previous  electrocardiogram when she visited the emergency. She denies any palpitations.     Weight loss.  The patient has a history of gastric sleeve surgery. She was 190 pounds for approximately 1 year and now she is 180 pounds. She denies any issues with swallowing. The patient notes that she swims and does [employer] for exercise.     History of bronchitis.  The patient states she has not had a bronchitis  flare up in approximately 2 to 3 years.    History of COVID-19.  She had COVID-19 in the beginning of the pandemic with loss of taste, smell, and body aches for approximately 4 days. She denies any fever. The patient states that she tested for COVID-19 antibodies.     Acid reflux.  The patient reports that she has a history of acid reflux. She had a scope performed approximately 1 year ago to see if he needed to perform another hiatal hernia repair. The patient takes antacids, which is not good with her thyroid medication; although she does not take it at the same time.    Health maintenance.  The patient is due for a mammogram in 10/2022. Her last colonoscopy was performed by Dr. Cavazos approximately 2 to 3 years ago. She denies any history of abnormal colonoscopies or polyps. The patient has not received the COVID-19 immunizations or shingles vaccinations.     Family history.  The patient has a history of heart disease and hypertension in her father who passed away from a myocardial infarction. Her sister is on blood pressure medication.    Patient Active Problem List   Diagnosis   • Lower extremity edema   • Hypothyroid   • GERD (gastroesophageal reflux disease)   • Hypertensive heart disease   • Depression   • Allergic rhinitis   • Benign essential hypertension   • Bronchitis with bronchospasm   • Low back pain at multiple sites   • Mixed hyperlipidemia   • Recurrent major depressive disorder, in full remission (HCC)   • Weight loss   • Restless leg syndrome   • Hypothyroidism   • Menopause   • Primary insomnia   • Acute right hip pain   • Body mass index (BMI) of 36.0 to 36.9 in adult   • Hypertension   • History of gastric surgery   • Routine medical exam   • Vitamin B12 deficiency   • Vitamin D deficiency        Past Medical History:   Diagnosis Date   • Allergy    • Back pain    • Disease of thyroid gland    • Edema    • Hypertension    • Hypothyroidism    • Menopause    • Restless leg syndrome        Past  Surgical History:   Procedure Laterality Date   • CHOLECYSTECTOMY  2014   • GASTRIC SLEEVE LAPAROSCOPIC     • KNEE ARTHROSCOPY     • REDUCTION MAMMAPLASTY      with lift   • TUMOR EXCISION      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       Family History   Problem Relation Age of Onset   • Heart attack Father    • Coronary artery disease Father 76   • Hypertension Father    • Lung cancer Mother    • Breast cancer Neg Hx    • Ovarian cancer Neg Hx        Social History     Socioeconomic History   • Marital status:    Tobacco Use   • Smoking status: Former Smoker     Packs/day: 1.00     Years: 20.00     Pack years: 20.00     Types: Cigarettes     Quit date:      Years since quittin.6   • Smokeless tobacco: Never Used   Substance and Sexual Activity   • Alcohol use: Yes     Alcohol/week: 1.0 - 2.0 standard drink     Types: 1 - 2 Glasses of wine per week   • Drug use: No   • Sexual activity: Yes     Partners: Male     Birth control/protection: Post-menopausal       Allergies   Allergen Reactions   • Hydrochlorothiazide Other (See Comments)     caused a 30-pound weight loss over 3-4 days  Kidney failure   • Levaquin [Levofloxacin] Myalgia   • Other      ALL DIURETICS, cause cramping.       Review of Systems   Constitutional: Negative for activity change, appetite change, diaphoresis, fatigue, unexpected weight gain and unexpected weight loss.   HENT: Negative for congestion, dental problem and hearing loss.    Eyes: Negative for blurred vision, double vision and visual disturbance.   Respiratory: Negative for chest tightness and shortness of breath.    Cardiovascular: Negative for chest pain, palpitations and leg swelling.   Gastrointestinal: Negative for abdominal pain, blood in stool, GERD and indigestion.   Endocrine: Negative for cold intolerance and heat intolerance.   Genitourinary: Negative for dysuria and hematuria.   Musculoskeletal: Negative for arthralgias and myalgias.  "  Skin: Negative for skin lesions.   Neurological: Negative for tremors, seizures, syncope, speech difficulty, weakness, headache, memory problem and confusion.   Hematological: Does not bruise/bleed easily.   Psychiatric/Behavioral: Negative for sleep disturbance and depressed mood. The patient is not nervous/anxious.         Vital Signs  Vitals:    08/26/22 1257   BP: 136/80   BP Location: Left arm   Patient Position: Sitting   Cuff Size: Large Adult   Pulse: 73   Temp: 98 °F (36.7 °C)   TempSrc: Temporal   SpO2: 98%   Weight: 81.6 kg (180 lb)   Height: 157.5 cm (62.01\")   PainSc: 0-No pain     Body mass index is 32.91 kg/m².  BMI is >= 30 and <35. (Class 1 Obesity). The following options were offered after discussion;: weight loss educational material (shared in after visit summary), exercise counseling/recommendations and nutrition counseling/recommendations     Advance Care Planning   ACP discussion was held with the patient during this visit. Patient does not have an advance directive, information provided.       Current Outpatient Medications:   •  [place] Thyroid 240 MG tablet, TAKE 1 TABLET BY MOUTH EVERY DAY, Disp: 90 tablet, Rfl: 1  •  CALCIUM PO, Take 1 tablet by mouth Daily., Disp: , Rfl:   •  Cholecalciferol (VITAMIN D3 PO), Take 1 capsule by mouth Daily., Disp: , Rfl:   •  escitalopram (LEXAPRO) 10 MG tablet, TAKE 1 TABLET BY MOUTH EVERY DAY, Disp: 90 tablet, Rfl: 1  •  levocetirizine (XYZAL) 5 MG tablet, Take 5 mg by mouth At Night As Needed for Allergies., Disp: , Rfl:   •  lisinopril (PRINIVIL,ZESTRIL) 10 MG tablet, Take 1 tablet by mouth Daily., Disp: 90 tablet, Rfl: 2  •  Multiple Vitamins-Minerals (MULTIVITAMIN ADULT PO), Take 1 tablet by mouth Daily., Disp: , Rfl:   •  pramipexole (MIRAPEX) 1 MG tablet, TAKE 2 TABLETS BY MOUTH EVERY NIGHT, Disp: 180 tablet, Rfl: 3  •  tiZANidine (ZANAFLEX) 4 MG tablet, Take 4 mg by mouth At Night As Needed for Muscle Spasms., Disp: , Rfl:     Physical " Exam  Vitals reviewed.      HEENT: Pupils equal reactive ENT clear, no facial asymmetry, pharynx is clear  NECK: No masses bruit or thyromegaly  CHEST: Clear without rales wheezes or rhonchi  CARDIAC: Regular rhythm without gallop rub or click, blood pressure heart rate stable.  ABD: Liver spleen normal  : Deferred  NEURO: Intact  PSYCH: Normal  EXTREM: No significant arthritic changes, no edema.  SKIN: Clear    ACE III MINI            Results Review:    No results found for this or any previous visit (from the past 672 hour(s)).    ECG 12 Lead    Date/Time: 2022 1:44 PM  Performed by: Phuong Stringer PA-C  Authorized by: Phuong Stringer PA-C   Comparison: not compared with previous ECG   Rhythm: sinus rhythm and sinus arrhythmia  BPM: 72    Clinical impression: non-specific ECG  Comments: Sinus rhythm with sinus arrhythmia with ventricular rate of 72bpm            Medication Review: Medications reviewed and noted    Social History     Socioeconomic History   • Marital status:    Tobacco Use   • Smoking status: Former Smoker     Packs/day: 1.00     Years: 20.00     Pack years: 20.00     Types: Cigarettes     Quit date:      Years since quittin.6   • Smokeless tobacco: Never Used   Substance and Sexual Activity   • Alcohol use: Yes     Alcohol/week: 1.0 - 2.0 standard drink     Types: 1 - 2 Glasses of wine per week   • Drug use: No   • Sexual activity: Yes     Partners: Male     Birth control/protection: Post-menopausal        Assessment/Plan:    Problem List Items Addressed This Visit        Cardiac and Vasculature    Benign essential hypertension    Overview     Patient encouraged to continue with weight loss.  Discussed importance of cutting back on sodium. Monitor blood pressures at home.           Current Assessment & Plan     - The patient's blood pressure is well controlled at this time. She will continue lisinopril 10 mg daily.  - I will refill the patient's lisinopril  10mg daily for 90 days.         Relevant Medications    lisinopril (PRINIVIL,ZESTRIL) 10 MG tablet    Other Relevant Orders    ECG 12 Lead    CBC & Differential    Comprehensive Metabolic Panel    MicroAlbumin, Urine, Random - Urine, Clean Catch    Mixed hyperlipidemia    Overview     HDL has improved slightly to 61.  LDL has increased 20 points.  Discussed importance of a low-fat, low-cholesterol diet.  Patient is encouraged to perform regular cardiovascular exercise most days of the week.         Relevant Orders    Lipid Panel       Endocrine and Metabolic    Hypothyroidism    Overview     Recent TSH 6.020.  Patient is encouraged to take [place] Thyroid 240 mg every day as directed.  We will recheck this today.         Relevant Medications    [place] Thyroid 240 MG tablet    Other Relevant Orders    TSH    T4, Free    T3, Free    Vitamin B12 deficiency    Relevant Orders    Vitamin B12    Vitamin D deficiency    Relevant Orders    Vitamin D 25 Hydroxy       Health Encounters    Routine medical exam - Primary    Overview     She declines COVID vaccination.  She will consider Shingrix.  We will complete fasting labs on Monday.  Current on colonoscopy.  She believes her last one was several years ago when she is due for repeat in 10 years.                There are no Patient Instructions on file for this visit.     Plan of care reviewed with patient at the conclusion of today's visit. Education was provided regarding diagnosis, management, and any prescribed or recommended OTC medications.Patient verbalizes understanding of and agreement with management plan.         I spent 28 minutes caring for Aleyda on this date of service. This time includes time spent by me in the following activities:preparing for the visit, reviewing tests, obtaining and/or reviewing a separately obtained history, performing a medically appropriate examination and/or evaluation , counseling and educating the patient/family/caregiver, ordering  medications, tests, or procedures, referring and communicating with other health care professionals  and documenting information in the medical record    Phuong Stringer PA-C      Note: Part of this note may be an electronic transcription/translation of spoken language to printed text using the Dragon Dictation system.    Transcribed from ambient dictation for Phuong Stringer PA-C by Florencia Arriaga .  08/26/22   15:30 EDT    Patient verbalized consent to the visit recording.  I have personally performed the services described in this document as transcribed by the above individual, and it is both accurate and complete.  Phuong Stringer PA-C  8/28/2022  16:20 EDT

## 2022-08-28 NOTE — PATIENT INSTRUCTIONS
"BMI for Adults  What is BMI?  Body mass index (BMI) is a number that is calculated from a person's weight and height. BMI can help estimate how much of a person's weight is composed of fat. BMI does not measure body fat directly. Rather, it is an alternative to procedures that directly measure body fat, which can be difficult and expensive.  BMI can help identify people who may be at higher risk for certain medical problems.  What are BMI measurements used for?  BMI is used as a screening tool to identify possible weight problems. It helps determine whether a person is obese, overweight, a healthy weight, or underweight.  BMI is useful for:  Identifying a weight problem that may be related to a medical condition or may increase the risk for medical problems.  Promoting changes, such as changes in diet and exercise, to help reach a healthy weight. BMI screening can be repeated to see if these changes are working.  How is BMI calculated?  BMI involves measuring your weight in relation to your height. Both height and weight are measured, and the BMI is calculated from those numbers. This can be done either in English (U.S[place] or metric measurements. Note that charts and online BMI calculators are available to help you find your BMI quickly and easily without having to do these calculations yourself.  To calculate your BMI in English (U.S.) measurements:    Measure your weight in pounds (lb).  Multiply the number of pounds by 703.  For example, for a person who weighs 180 lb, multiply that number by 703, which equals 126,540.  Measure your height in inches. Then multiply that number by itself to get a measurement called \"inches squared.\"  For example, for a person who is 70 inches tall, the \"inches squared\" measurement is 70 inches x 70 inches, which equals 4,900 inches squared.  Divide the total from step 2 (number of lb x 703) by the total from step 3 (inches squared): 126,540 ÷ 4,900 = 25.8. This is your BMI.    To " "calculate your BMI in metric measurements:  Measure your weight in kilograms (kg).  Measure your height in meters (m). Then multiply that number by itself to get a measurement called \"meters squared.\"  For example, for a person who is 1.75 m tall, the \"meters squared\" measurement is 1.75 m x 1.75 m, which is equal to 3.1 meters squared.  Divide the number of kilograms (your weight) by the meters squared number. In this example: 70 ÷ 3.1 = 22.6. This is your BMI.  What do the results mean?  BMI charts are used to identify whether you are underweight, normal weight, overweight, or obese. The following guidelines will be used:  Underweight: BMI less than 18.5.  Normal weight: BMI between 18.5 and 24.9.  Overweight: BMI between 25 and 29.9.  Obese: BMI of 30 or above.  Keep these notes in mind:  Weight includes both fat and muscle, so someone with a muscular build, such as an athlete, may have a BMI that is higher than 24.9. In cases like these, BMI is not an accurate measure of body fat.  To determine if excess body fat is the cause of a BMI of 25 or higher, further assessments may need to be done by a health care provider.  BMI is usually interpreted in the same way for men and women.  Where to find more information  For more information about BMI, including tools to quickly calculate your BMI, go to these websites:  Centers for Disease Control and Prevention: www.cdc.gov  American Heart Association: www.heart.org  National Heart, Lung, and Blood [place]: www.nhlbi.nih.gov  Summary  Body mass index (BMI) is a number that is calculated from a person's weight and height.  BMI may help estimate how much of a person's weight is composed of fat. BMI can help identify those who may be at higher risk for certain medical problems.  BMI can be measured using English measurements or metric measurements.  BMI charts are used to identify whether you are underweight, normal weight, overweight, or obese.  This information is not " "intended to replace advice given to you by your health care provider. Make sure you discuss any questions you have with your health care provider.  Document Revised: 09/09/2020 Document Reviewed: 07/17/2020  [employer] Patient Education © 2021 [employer]  Critical care medicine: Principles of diagnosis and management in the adult (4th ed., pp. 0171-1021). [place]yer.\"> Elias's anesthesia (8th ed., pp. 232-250). Sawyer.\">   Advance Directive    Advance directives are legal documents that allow you to make decisions about your health care and medical treatment in case you become unable to communicate for yourself. Advance directives let your wishes be known to family, friends, and health care providers.  Discussing and writing advance directives should happen over time rather than all at once. Advance directives can be changed and updated at any time. There are different types of advance directives, such as:  Medical power of .  Living will.  Do not resuscitate (DNR) order or do not attempt resuscitation (DNAR) order.  Health care proxy and medical power of   A health care proxy is also called a health care agent. This person is appointed to make medical decisions for you when you are unable to make decisions for yourself. Generally, people ask a trusted friend or family member to act as their proxy and represent their preferences. Make sure you have an agreement with your trusted person to act as your proxy. A proxy may have to make a medical decision on your behalf if your wishes are not known.  A medical power of , also called a durable power of  for health care, is a legal document that names your health care proxy. Depending on the laws in your state, the document may need to be:  Signed.  Notarized.  Dated.  Copied.  Witnessed.  Incorporated into your medical record.  You may also want to appoint a trusted person to manage your money in the event you are unable to do so. This is " called a durable power of  for finances. It is a separate legal document from the durable power of  for health care. You may choose your health care proxy or someone different to act as your agent in money matters.  If you do not appoint a proxy, or there is a concern that the proxy is not acting in your best interest, a court may appoint a guardian to act on your behalf.  Living will  A living will is a set of instructions that state your wishes about medical care when you cannot express them yourself. Health care providers should keep a copy of your living will in your medical record. You may want to give a copy to family members or friends. To alert caregivers in case of an emergency, you can place a card in your wallet to let them know that you have a living will and where they can find it. A living will is used if you become:  Terminally ill.  Disabled.  Unable to communicate or make decisions.  The following decisions should be included in your living will:  To use or not to use life support equipment, such as dialysis machines and breathing machines (ventilators).  Whether you want a DNR or DNAR order. This tells health care providers not to use cardiopulmonary resuscitation (CPR) if breathing or heartbeat stops.  To use or not to use tube feeding.  To be given or not to be given food and fluids.  Whether you want comfort (palliative) care when the goal becomes comfort rather than a cure.  Whether you want to donate your organs and tissues.  A living will does not give instructions for distributing your money and property if you should pass away.  DNR or DNAR  A DNR or DNAR order is a request not to have CPR in the event that your heart stops beating or you stop breathing. If a DNR or DNAR order has not been made and shared, a health care provider will try to help any patient whose heart has stopped or who has stopped breathing. If you plan to have surgery, talk with your health care provider  about how your DNR or DNAR order will be followed if problems occur.  What if I do not have an advance directive?  Some states assign family decision makers to act on your behalf if you do not have an advance directive. Each state has its own laws about advance directives. You may want to check with your health care provider, , or state representative about the laws in your state.  Summary  Advance directives are legal documents that allow you to make decisions about your health care and medical treatment in case you become unable to communicate for yourself.  The process of discussing and writing advance directives should happen over time. You can change and update advance directives at any time.  Advance directives may include a medical power of , a living will, and a DNR or DNAR order.  This information is not intended to replace advice given to you by your health care provider. Make sure you discuss any questions you have with your health care provider.  Document Revised: 09/21/2021 Document Reviewed: 09/21/2021  Elsevier Patient Education © 2021 [employer].

## 2022-08-31 ENCOUNTER — LAB (OUTPATIENT)
Dept: LAB | Facility: HOSPITAL | Age: 62
End: 2022-08-31

## 2022-08-31 DIAGNOSIS — E55.9 VITAMIN D DEFICIENCY: ICD-10-CM

## 2022-08-31 DIAGNOSIS — I10 BENIGN ESSENTIAL HYPERTENSION: ICD-10-CM

## 2022-08-31 DIAGNOSIS — E53.8 VITAMIN B12 DEFICIENCY: ICD-10-CM

## 2022-08-31 DIAGNOSIS — E78.2 MIXED HYPERLIPIDEMIA: ICD-10-CM

## 2022-08-31 DIAGNOSIS — E03.9 ACQUIRED HYPOTHYROIDISM: ICD-10-CM

## 2022-08-31 LAB
25(OH)D3 SERPL-MCNC: 51.1 NG/ML (ref 30–100)
ALBUMIN SERPL-MCNC: 4.4 G/DL (ref 3.5–5.2)
ALBUMIN UR-MCNC: 1.3 MG/DL
ALBUMIN/GLOB SERPL: 2.3 G/DL
ALP SERPL-CCNC: 90 U/L (ref 39–117)
ALT SERPL W P-5'-P-CCNC: 13 U/L (ref 1–33)
ANION GAP SERPL CALCULATED.3IONS-SCNC: 10 MMOL/L (ref 5–15)
AST SERPL-CCNC: 17 U/L (ref 1–32)
BASOPHILS # BLD AUTO: 0.05 10*3/MM3 (ref 0–0.2)
BASOPHILS NFR BLD AUTO: 1.1 % (ref 0–1.5)
BILIRUB SERPL-MCNC: 0.3 MG/DL (ref 0–1.2)
BUN SERPL-MCNC: 16 MG/DL (ref 8–23)
BUN/CREAT SERPL: 20 (ref 7–25)
CALCIUM SPEC-SCNC: 9 MG/DL (ref 8.6–10.5)
CHLORIDE SERPL-SCNC: 105 MMOL/L (ref 98–107)
CHOLEST SERPL-MCNC: 198 MG/DL (ref 0–200)
CO2 SERPL-SCNC: 27 MMOL/L (ref 22–29)
CREAT SERPL-MCNC: 0.8 MG/DL (ref 0.57–1)
DEPRECATED RDW RBC AUTO: 43 FL (ref 37–54)
EGFRCR SERPLBLD CKD-EPI 2021: 82.4 ML/MIN/1.73
EOSINOPHIL # BLD AUTO: 0.23 10*3/MM3 (ref 0–0.4)
EOSINOPHIL NFR BLD AUTO: 4.9 % (ref 0.3–6.2)
ERYTHROCYTE [DISTWIDTH] IN BLOOD BY AUTOMATED COUNT: 13.1 % (ref 12.3–15.4)
GLOBULIN UR ELPH-MCNC: 1.9 GM/DL
GLUCOSE SERPL-MCNC: 83 MG/DL (ref 65–99)
HCT VFR BLD AUTO: 39.1 % (ref 34–46.6)
HDLC SERPL-MCNC: 53 MG/DL (ref 40–60)
HGB BLD-MCNC: 12.9 G/DL (ref 12–15.9)
IMM GRANULOCYTES # BLD AUTO: 0.02 10*3/MM3 (ref 0–0.05)
IMM GRANULOCYTES NFR BLD AUTO: 0.4 % (ref 0–0.5)
LDLC SERPL CALC-MCNC: 128 MG/DL (ref 0–100)
LDLC/HDLC SERPL: 2.38 {RATIO}
LYMPHOCYTES # BLD AUTO: 1.39 10*3/MM3 (ref 0.7–3.1)
LYMPHOCYTES NFR BLD AUTO: 29.8 % (ref 19.6–45.3)
MCH RBC QN AUTO: 30.4 PG (ref 26.6–33)
MCHC RBC AUTO-ENTMCNC: 33 G/DL (ref 31.5–35.7)
MCV RBC AUTO: 92 FL (ref 79–97)
MONOCYTES # BLD AUTO: 0.46 10*3/MM3 (ref 0.1–0.9)
MONOCYTES NFR BLD AUTO: 9.9 % (ref 5–12)
NEUTROPHILS NFR BLD AUTO: 2.52 10*3/MM3 (ref 1.7–7)
NEUTROPHILS NFR BLD AUTO: 53.9 % (ref 42.7–76)
NRBC BLD AUTO-RTO: 0 /100 WBC (ref 0–0.2)
PLATELET # BLD AUTO: 231 10*3/MM3 (ref 140–450)
PMV BLD AUTO: 9.6 FL (ref 6–12)
POTASSIUM SERPL-SCNC: 4.2 MMOL/L (ref 3.5–5.2)
PROT SERPL-MCNC: 6.3 G/DL (ref 6–8.5)
RBC # BLD AUTO: 4.25 10*6/MM3 (ref 3.77–5.28)
SODIUM SERPL-SCNC: 142 MMOL/L (ref 136–145)
T3FREE SERPL-MCNC: 8.84 PG/ML (ref 2–4.4)
T4 FREE SERPL-MCNC: 1.02 NG/DL (ref 0.93–1.7)
TRIGL SERPL-MCNC: 95 MG/DL (ref 0–150)
TSH SERPL DL<=0.05 MIU/L-ACNC: 6.9 UIU/ML (ref 0.27–4.2)
VIT B12 BLD-MCNC: 422 PG/ML (ref 211–946)
VLDLC SERPL-MCNC: 17 MG/DL (ref 5–40)
WBC NRBC COR # BLD: 4.67 10*3/MM3 (ref 3.4–10.8)

## 2022-08-31 PROCEDURE — 82043 UR ALBUMIN QUANTITATIVE: CPT

## 2022-08-31 PROCEDURE — 82306 VITAMIN D 25 HYDROXY: CPT

## 2022-08-31 PROCEDURE — 84481 FREE ASSAY (FT-3): CPT

## 2022-08-31 PROCEDURE — 80061 LIPID PANEL: CPT

## 2022-08-31 PROCEDURE — 84439 ASSAY OF FREE THYROXINE: CPT

## 2022-08-31 PROCEDURE — 80050 GENERAL HEALTH PANEL: CPT

## 2022-08-31 PROCEDURE — 82607 VITAMIN B-12: CPT

## 2022-09-07 ENCOUNTER — TELEPHONE (OUTPATIENT)
Dept: INTERNAL MEDICINE | Facility: CLINIC | Age: 62
End: 2022-09-07

## 2022-09-07 NOTE — TELEPHONE ENCOUNTER
LM for pt to call back.    ----- Message from Phuong Stringer PA-C sent at 9/7/2022 10:49 AM EDT -----  Is she taking biotin?  If so, she needs to stop this and recheck thyroid panel in 8 weeks.

## 2022-09-08 NOTE — TELEPHONE ENCOUNTER
Patient called she stated she has never taken Biotin and wanted to know if there is a dose change on her thyroid medicine also she said if there is anything else you wanted to let her know about her blood work to send her a message on [facility] [employer]

## 2022-09-08 NOTE — TELEPHONE ENCOUNTER
No change in her thyroid for now.  We need to recheck levels in 8 weeks.  Also, her LDL cholesterol is slightly elevated at 129, needs to cut back on processed foods and fatty foods.

## 2022-10-26 ENCOUNTER — TELEPHONE (OUTPATIENT)
Dept: INTERNAL MEDICINE | Facility: CLINIC | Age: 62
End: 2022-10-26

## 2023-02-02 ENCOUNTER — OFFICE VISIT (OUTPATIENT)
Dept: INTERNAL MEDICINE | Facility: CLINIC | Age: 63
End: 2023-02-02
Payer: COMMERCIAL

## 2023-02-02 VITALS
WEIGHT: 175 LBS | SYSTOLIC BLOOD PRESSURE: 142 MMHG | OXYGEN SATURATION: 98 % | TEMPERATURE: 97.7 F | BODY MASS INDEX: 32.2 KG/M2 | HEART RATE: 64 BPM | DIASTOLIC BLOOD PRESSURE: 86 MMHG | HEIGHT: 62 IN

## 2023-02-02 DIAGNOSIS — M79.641 RIGHT HAND PAIN: ICD-10-CM

## 2023-02-02 DIAGNOSIS — M25.511 CHRONIC RIGHT SHOULDER PAIN: Primary | ICD-10-CM

## 2023-02-02 DIAGNOSIS — G89.29 CHRONIC RIGHT SHOULDER PAIN: Primary | ICD-10-CM

## 2023-02-02 PROCEDURE — 99213 OFFICE O/P EST LOW 20 MIN: CPT | Performed by: PHYSICIAN ASSISTANT

## 2023-02-02 NOTE — PROGRESS NOTES
[facility] Internal Medicine    Aleyda Louise  1960   [id number]      Patient Care Team:  Phuong Stringer PA-C as PCP - General (Internal Medicine)  Phuong Stringer PA-C as Physician Assistant (Internal Medicine)  Benito Sandoval MD (Inactive) as Consulting Physician (Gynecology)  Jarod Cavazos MD as Consulting Physician (Gastroenterology)  Wali Mercedes MD as Surgeon (Vascular Surgery)    Chief Complaint::   Chief Complaint   Patient presents with   • rotator cuff and hand pain      In both shoulder right side seems  worse         HPI  Bruce is a 62 year old female who presents with right shoulder pain x several months.  She has injured her right shoulder years ago and describes it as the same type of pain.  She reports the pain has progressed and she now has difficulty reaching back, and sleeping on her right shoulder.  She was seeing Dr. Lucia. Also, new right hand pain.       Patient Active Problem List   Diagnosis   • Lower extremity edema   • Hypothyroid   • GERD (gastroesophageal reflux disease)   • Hypertensive heart disease   • Depression   • Allergic rhinitis   • Benign essential hypertension   • Bronchitis with bronchospasm   • Low back pain at multiple sites   • Mixed hyperlipidemia   • Recurrent major depressive disorder, in full remission (HCC)   • Weight loss   • Restless leg syndrome   • Hypothyroidism   • Menopause   • Primary insomnia   • Acute right hip pain   • Body mass index (BMI) of 36.0 to 36.9 in adult   • Hypertension   • History of gastric surgery   • Routine medical exam   • Vitamin B12 deficiency   • Vitamin D deficiency   • Chronic right shoulder pain   • Right hand pain        Past Medical History:   Diagnosis Date   • Allergy    • Back pain    • Disease of thyroid gland    • Edema    • Hypertension    • Hypothyroidism    • Menopause    • Restless leg syndrome        Past Surgical History:   Procedure Laterality Date   • CHOLECYSTECTOMY  07/2014   •  "GASTRIC SLEEVE LAPAROSCOPIC     • KNEE ARTHROSCOPY     • REDUCTION MAMMAPLASTY      with lift   • TUMOR EXCISION      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       Family History   Problem Relation Age of Onset   • Heart attack Father    • Coronary artery disease Father 76   • Hypertension Father    • Lung cancer Mother    • Breast cancer Neg Hx    • Ovarian cancer Neg Hx        Social History     Socioeconomic History   • Marital status:    Tobacco Use   • Smoking status: Former     Packs/day: 1.00     Years: 20.00     Pack years: 20.00     Types: Cigarettes     Quit date:      Years since quittin.1   • Smokeless tobacco: Never   Substance and Sexual Activity   • Alcohol use: Yes     Alcohol/week: 1.0 - 2.0 standard drink     Types: 1 - 2 Glasses of wine per week   • Drug use: No   • Sexual activity: Yes     Partners: Male     Birth control/protection: Post-menopausal       Allergies   Allergen Reactions   • Hydrochlorothiazide Other (See Comments)     caused a 30-pound weight loss over 3-4 days  Kidney failure   • Levaquin [Levofloxacin] Myalgia   • Other      ALL DIURETICS, cause cramping.       Review of Systems   Musculoskeletal: Positive for arthralgias.   Skin: Negative for color change and dry skin.        Vital Signs  Vitals:    23 1606   BP: 142/86   BP Location: Left arm   Patient Position: Sitting   Cuff Size: Large Adult   Pulse: 64   Temp: 97.7 °F (36.5 °C)   TempSrc: Temporal   SpO2: 98%   Weight: 79.4 kg (175 lb)   Height: 157.5 cm (62.01\")   PainSc: 4  Comment: 8 when moving     Body mass index is 32 kg/m².  BMI is >= 30 and <35. (Class 1 Obesity). The following options were offered after discussion;: weight loss educational material (shared in after visit summary), exercise counseling/recommendations and nutrition counseling/recommendations     Advance Care Planning   ACP discussion was held with the patient during this visit. Patient does not have an advance " directive, information provided.       Current Outpatient Medications:   •  CALCIUM PO, Take 1 tablet by mouth Daily., Disp: , Rfl:   •  Cholecalciferol (VITAMIN D3 PO), Take 1 capsule by mouth Daily., Disp: , Rfl:   •  escitalopram (LEXAPRO) 10 MG tablet, TAKE 1 TABLET BY MOUTH EVERY DAY, Disp: 90 tablet, Rfl: 1  •  levocetirizine (XYZAL) 5 MG tablet, Take 5 mg by mouth At Night As Needed for Allergies., Disp: , Rfl:   •  lisinopril (PRINIVIL,ZESTRIL) 10 MG tablet, Take 1 tablet by mouth Daily., Disp: 90 tablet, Rfl: 2  •  Multiple Vitamins-Minerals (MULTIVITAMIN ADULT PO), Take 1 tablet by mouth Daily., Disp: , Rfl:   •  pramipexole (MIRAPEX) 1 MG tablet, TAKE 2 TABLETS BY MOUTH EVERY NIGHT, Disp: 180 tablet, Rfl: 3  •  tiZANidine (ZANAFLEX) 4 MG tablet, Take 4 mg by mouth At Night As Needed for Muscle Spasms., Disp: , Rfl:   •  thyroid ([place] Thyroid) 240 MG tablet, Take 1 tablet by mouth Daily., Disp: 90 tablet, Rfl: 1    Physical Exam  Vitals and nursing note reviewed.   Musculoskeletal:      Right shoulder: Tenderness present. No crepitus. Decreased range of motion. Decreased strength.      Right hand: Tenderness present. Decreased strength.        Arms:       Cervical back: Normal range of motion and neck supple.          ACE III MINI            Results Review:    No results found for this or any previous visit (from the past 672 hour(s)).  Procedures    Medication Review: Medications reviewed and noted    Social History     Socioeconomic History   • Marital status:    Tobacco Use   • Smoking status: Former     Packs/day: 1.00     Years: 20.00     Pack years: 20.00     Types: Cigarettes     Quit date:      Years since quittin.1   • Smokeless tobacco: Never   Substance and Sexual Activity   • Alcohol use: Yes     Alcohol/week: 1.0 - 2.0 standard drink     Types: 1 - 2 Glasses of wine per week   • Drug use: No   • Sexual activity: Yes     Partners: Male     Birth control/protection:  Post-menopausal        Assessment/Plan:    Diagnoses and all orders for this visit:    1. Chronic right shoulder pain (Primary)  -     Ambulatory Referral to Orthopedic Surgery  -     Ambulatory Referral to Physical Therapy Evaluate and treat    2. Right hand pain  -     Ambulatory Referral to Orthopedic Surgery         Plan of care reviewed with patient at the conclusion of today's visit. Education was provided regarding diagnosis, management, and any prescribed or recommended OTC medications.Patient verbalizes understanding of and agreement with management plan.         I spent 15 minutes caring for Aleyda on this date of service. This time includes time spent by me in the following activities:preparing for the visit, reviewing tests, obtaining and/or reviewing a separately obtained history, performing a medically appropriate examination and/or evaluation , counseling and educating the patient/family/caregiver, ordering medications, tests, or procedures, referring and communicating with other health care professionals  and documenting information in the medical record    Phuong Stringer PA-C      Note: Part of this note may be an electronic transcription/translation of spoken language to printed text using the Dragon Dictation system.

## 2023-02-05 PROBLEM — G89.29 CHRONIC RIGHT SHOULDER PAIN: Status: ACTIVE | Noted: 2023-02-05

## 2023-02-05 PROBLEM — M79.641 RIGHT HAND PAIN: Status: ACTIVE | Noted: 2023-02-05

## 2023-02-05 PROBLEM — M25.511 CHRONIC RIGHT SHOULDER PAIN: Status: ACTIVE | Noted: 2023-02-05

## 2023-02-05 NOTE — PATIENT INSTRUCTIONS

## 2023-04-03 ENCOUNTER — TELEMEDICINE (OUTPATIENT)
Dept: INTERNAL MEDICINE | Facility: CLINIC | Age: 63
End: 2023-04-03
Payer: COMMERCIAL

## 2023-04-03 DIAGNOSIS — J30.9 ALLERGIC RHINITIS, UNSPECIFIED SEASONALITY, UNSPECIFIED TRIGGER: Primary | ICD-10-CM

## 2023-04-03 NOTE — PROGRESS NOTES
Follow Up Office Visit      Patient Name: Aleyda Louise  : 1960   MRN: [id number]   Care Team: Patient Care Team:  Phuong Stringer PA-C as PCP - General (Internal Medicine)  Phuong Stringer PA-C as Physician Assistant (Internal Medicine)  Benito Sandoval MD (Inactive) as Consulting Physician (Gynecology)  Jarod Cavazos MD as Consulting Physician (Gastroenterology)  Wali Mercedes MD as Surgeon (Vascular Surgery)    Chief Complaint:  No chief complaint on file.    You have chosen to receive care through a telehealth visit.  Do you consent to use a video/audio connection for your medical care today?     YES        History of Present Illness: Aleyda Louise is a 62 y.o. female who is here today to follow up with ***.     Subjective      Review of Systems:   Review of Systems    I have reviewed and the following portions of the patient's history were updated as appropriate: past family history, past medical history, past social history, past surgical history and problem list.    Medications:     Current Outpatient Medications:   •  CALCIUM PO, Take 1 tablet by mouth Daily., Disp: , Rfl:   •  Cholecalciferol (VITAMIN D3 PO), Take 1 capsule by mouth Daily., Disp: , Rfl:   •  escitalopram (LEXAPRO) 10 MG tablet, TAKE 1 TABLET BY MOUTH EVERY DAY, Disp: 90 tablet, Rfl: 1  •  levocetirizine (XYZAL) 5 MG tablet, Take 5 mg by mouth At Night As Needed for Allergies., Disp: , Rfl:   •  lisinopril (PRINIVIL,ZESTRIL) 10 MG tablet, Take 1 tablet by mouth Daily., Disp: 90 tablet, Rfl: 2  •  Multiple Vitamins-Minerals (MULTIVITAMIN ADULT PO), Take 1 tablet by mouth Daily., Disp: , Rfl:   •  pramipexole (MIRAPEX) 1 MG tablet, TAKE 2 TABLETS BY MOUTH EVERY NIGHT, Disp: 180 tablet, Rfl: 3  •  thyroid ([place] Thyroid) 240 MG tablet, Take 1 tablet by mouth Daily., Disp: 90 tablet, Rfl: 1  •  tiZANidine (ZANAFLEX) 4 MG tablet, Take 4 mg by mouth At Night As Needed for Muscle Spasms., Disp: , Rfl:      Allergies:   Allergies   Allergen Reactions   • Hydrochlorothiazide Other (See Comments)     caused a 30-pound weight loss over 3-4 days  Kidney failure   • Levaquin [Levofloxacin] Myalgia   • Other      ALL DIURETICS, cause cramping.       Objective     Physical Exam:  Vital Signs: There were no vitals filed for this visit.  There is no height or weight on file to calculate BMI.     Physical Exam    Assessment / Plan      Assessment/Plan:   Problems Addressed This Visit  No diagnosis found.   Screenings due include:***.  Immunizations recommended are:***.  Routine labs recommended are: ***.    Plan of care reviewed with patient at the conclusion of today's visit. Education was provided regarding diagnosis and management.  Patient verbalizes understanding of and agreement with management plan.    There are no Patient Instructions on file for this visit.    Follow Up:   No follow-ups on file.    {Time Spent (Optional):73760}    CANDICE Rasmussen  [facility] [address]    Please note that portions of this note were completed with a voice recognition program.

## 2023-04-04 ENCOUNTER — OFFICE VISIT (OUTPATIENT)
Dept: INTERNAL MEDICINE | Facility: CLINIC | Age: 63
End: 2023-04-04
Payer: COMMERCIAL

## 2023-04-04 VITALS
TEMPERATURE: 97.8 F | HEART RATE: 73 BPM | DIASTOLIC BLOOD PRESSURE: 82 MMHG | HEIGHT: 62 IN | WEIGHT: 169 LBS | SYSTOLIC BLOOD PRESSURE: 120 MMHG | BODY MASS INDEX: 31.1 KG/M2

## 2023-04-04 DIAGNOSIS — R05.1 ACUTE COUGH: ICD-10-CM

## 2023-04-04 DIAGNOSIS — J20.8 ACUTE BRONCHITIS DUE TO OTHER SPECIFIED ORGANISMS: Primary | ICD-10-CM

## 2023-04-04 DIAGNOSIS — J02.9 SORE THROAT: ICD-10-CM

## 2023-04-04 LAB
EXPIRATION DATE: NORMAL
EXPIRATION DATE: NORMAL
FLUAV AG UPPER RESP QL IA.RAPID: NOT DETECTED
FLUBV AG UPPER RESP QL IA.RAPID: NOT DETECTED
INTERNAL CONTROL: NORMAL
INTERNAL CONTROL: NORMAL
Lab: NORMAL
Lab: NORMAL
S PYO AG THROAT QL: NEGATIVE
SARS-COV-2 AG UPPER RESP QL IA.RAPID: NOT DETECTED

## 2023-04-04 PROCEDURE — 99213 OFFICE O/P EST LOW 20 MIN: CPT | Performed by: INTERNAL MEDICINE

## 2023-04-04 PROCEDURE — 87880 STREP A ASSAY W/OPTIC: CPT | Performed by: INTERNAL MEDICINE

## 2023-04-04 PROCEDURE — 87428 SARSCOV & INF VIR A&B AG IA: CPT | Performed by: INTERNAL MEDICINE

## 2023-04-04 RX ORDER — METHYLPREDNISOLONE 4 MG/1
TABLET ORAL
Qty: 21 TABLET | Refills: 0 | Status: SHIPPED | OUTPATIENT
Start: 2023-04-04

## 2023-04-04 RX ORDER — SULFAMETHOXAZOLE AND TRIMETHOPRIM 800; 160 MG/1; MG/1
1 TABLET ORAL 2 TIMES DAILY
Qty: 14 TABLET | Refills: 0 | Status: SHIPPED | OUTPATIENT
Start: 2023-04-04

## 2023-04-04 RX ORDER — BENZONATATE 100 MG/1
100 CAPSULE ORAL 3 TIMES DAILY PRN
Qty: 30 CAPSULE | Refills: 0 | Status: SHIPPED | OUTPATIENT
Start: 2023-04-04

## 2023-04-04 NOTE — PROGRESS NOTES
[facility]     Aleyda Louise  1960   [id number]      Patient Care Team:  Phuong Stringer PA-C as PCP - General (Internal Medicine)  Phuong Stringer PA-C as Physician Assistant (Internal Medicine)  Benito Sandoval MD (Inactive) as Consulting Physician (Gynecology)  Jarod Cavazos MD as Consulting Physician (Gastroenterology)  Wali Mercedes MD as Surgeon (Vascular Surgery)    Chief Complaint:: No chief complaint on file.       HPI  The patient comes in complaining of a sore throat and cough.    Acute bronchitis  The patient had been diagnosed with bronchitis several times in the past, especially during the fall and spring seasons, depending on how much rain there is. She has allergies to mold. Her symptoms usually start with allergy drainage and then progress to a sore throat accompanied by chest tightness and a productive cough. She denies having a fever and myalgia. She explains that when her symptoms worsen, it can lead to asthmatic bronchitis. She usually takes a prescribed antibiotic, Medrol Dosepak, and Tessalon Perles. She had acquired COVID-19 infections in the past. Taking Z-Marv has been ineffective for her in the past. She prefers not to take amoxicillin because it causes her to have a yeast infection. She believes she currently has bronchitis.     Chronic Conditions:      Patient Active Problem List   Diagnosis   • Lower extremity edema   • Hypothyroid   • GERD (gastroesophageal reflux disease)   • Hypertensive heart disease   • Depression   • Allergic rhinitis   • Benign essential hypertension   • Bronchitis with bronchospasm   • Low back pain at multiple sites   • Mixed hyperlipidemia   • Recurrent major depressive disorder, in full remission   • Weight loss   • Restless leg syndrome   • Hypothyroidism   • Menopause   • Primary insomnia   • Acute right hip pain   • Body mass index (BMI) of 36.0 to 36.9 in adult   • Hypertension   • History of gastric surgery    • Routine medical exam   • Vitamin B12 deficiency   • Vitamin D deficiency   • Chronic right shoulder pain   • Right hand pain        Past Medical History:   Diagnosis Date   • Allergy    • Back pain    • Disease of thyroid gland    • Edema    • Hypertension    • Hypothyroidism    • Menopause    • Restless leg syndrome        Past Surgical History:   Procedure Laterality Date   • CHOLECYSTECTOMY  2014   • GASTRIC SLEEVE LAPAROSCOPIC     • KNEE ARTHROSCOPY     • REDUCTION MAMMAPLASTY      with lift   • TUMOR EXCISION      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       Family History   Problem Relation Age of Onset   • Heart attack Father    • Coronary artery disease Father 76   • Hypertension Father    • Lung cancer Mother    • Breast cancer Neg Hx    • Ovarian cancer Neg Hx        Social History     Socioeconomic History   • Marital status:    Tobacco Use   • Smoking status: Former     Packs/day: 1.00     Years: 20.00     Pack years: 20.00     Types: Cigarettes     Quit date:      Years since quittin.2   • Smokeless tobacco: Never   Substance and Sexual Activity   • Alcohol use: Yes     Alcohol/week: 1.0 - 2.0 standard drink     Types: 1 - 2 Glasses of wine per week   • Drug use: No   • Sexual activity: Yes     Partners: Male     Birth control/protection: Post-menopausal       Allergies   Allergen Reactions   • Hydrochlorothiazide Other (See Comments)     caused a 30-pound weight loss over 3-4 days  Kidney failure   • Levaquin [Levofloxacin] Myalgia   • Other      ALL DIURETICS, cause cramping.         Current Outpatient Medications:   •  CALCIUM PO, Take 1 tablet by mouth Daily., Disp: , Rfl:   •  Cholecalciferol (VITAMIN D3 PO), Take 1 capsule by mouth Daily., Disp: , Rfl:   •  escitalopram (LEXAPRO) 10 MG tablet, TAKE 1 TABLET BY MOUTH EVERY DAY, Disp: 90 tablet, Rfl: 1  •  levocetirizine (XYZAL) 5 MG tablet, Take 1 tablet by mouth At Night As Needed for Allergies., Disp: ,  "Rfl:   •  lisinopril (PRINIVIL,ZESTRIL) 10 MG tablet, Take 1 tablet by mouth Daily., Disp: 90 tablet, Rfl: 2  •  Multiple Vitamins-Minerals (MULTIVITAMIN ADULT PO), Take 1 tablet by mouth Daily., Disp: , Rfl:   •  pramipexole (MIRAPEX) 1 MG tablet, TAKE 2 TABLETS BY MOUTH EVERY NIGHT, Disp: 180 tablet, Rfl: 3  •  thyroid (Burton Thyroid) 240 MG tablet, Take 1 tablet by mouth Daily., Disp: 90 tablet, Rfl: 1  •  tiZANidine (ZANAFLEX) 4 MG tablet, Take 1 tablet by mouth At Night As Needed for Muscle Spasms., Disp: , Rfl:   •  benzonatate (Tessalon Perles) 100 MG capsule, Take 1 capsule by mouth 3 (Three) Times a Day As Needed for Cough., Disp: 30 capsule, Rfl: 0  •  methylPREDNISolone (MEDROL) 4 MG dose pack, Take as directed on package instructions., Disp: 21 tablet, Rfl: 0  •  sulfamethoxazole-trimethoprim (BACTRIM DS,SEPTRA DS) 800-160 MG per tablet, Take 1 tablet by mouth 2 (Two) Times a Day., Disp: 14 tablet, Rfl: 0    Review of Systems   Constitutional: Negative for chills, fatigue and fever.   HENT: Negative for congestion, ear pain and sinus pressure.    Respiratory: Negative for cough, chest tightness, shortness of breath and wheezing.    Cardiovascular: Negative for chest pain and palpitations.   Gastrointestinal: Negative for abdominal pain, blood in stool and constipation.   Skin: Negative for color change.   Allergic/Immunologic: Negative for environmental allergies.   Neurological: Negative for dizziness, speech difficulty and headache.   Psychiatric/Behavioral: Negative for decreased concentration. The patient is not nervous/anxious.         Vital Signs  Vitals:    04/04/23 1010   BP: 120/82   BP Location: Left arm   Patient Position: Sitting   Cuff Size: Adult   Pulse: 73   Temp: 97.8 °F (36.6 °C)   TempSrc: Temporal   Weight: 76.7 kg (169 lb)   Height: 157.5 cm (62.01\")   PainSc: 0-No pain       Physical Exam  Vitals reviewed.   Constitutional:       Appearance: She is well-developed.   HENT:      " Head: Normocephalic and atraumatic.      Mouth/Throat:      Comments: There is mild posterior pharyngeal erythema.  Eyes:      Pupils: Pupils are equal, round, and reactive to light.   Cardiovascular:      Rate and Rhythm: Normal rate and regular rhythm.      Heart sounds: Normal heart sounds.   Pulmonary:      Effort: Pulmonary effort is normal.      Breath sounds: Normal breath sounds.      Comments: There is mild expiratory wheezing in both lung fields. No rales or rhonchi.  Abdominal:      General: Bowel sounds are normal.      Palpations: Abdomen is soft.   Musculoskeletal:         General: Normal range of motion.      Cervical back: Normal range of motion.   Skin:     General: Skin is warm and dry.   Neurological:      Mental Status: She is alert and oriented to person, place, and time.          Procedures    ACE III MINI             Assessment/Plan:    Diagnoses and all orders for this visit:    1. Acute bronchitis due to other specified organisms (Primary)    2. Acute cough  -     POCT SARS-CoV-2 Antigen SHANI  -     POC Rapid Strep A    3. Sore throat  -     POC Rapid Strep A    Other orders  -     methylPREDNISolone (MEDROL) 4 MG dose pack; Take as directed on package instructions.  Dispense: 21 tablet; Refill: 0  -     benzonatate (Tessalon Perles) 100 MG capsule; Take 1 capsule by mouth 3 (Three) Times a Day As Needed for Cough.  Dispense: 30 capsule; Refill: 0  -     sulfamethoxazole-trimethoprim (BACTRIM DS,SEPTRA DS) 800-160 MG per tablet; Take 1 tablet by mouth 2 (Two) Times a Day.  Dispense: 14 tablet; Refill: 0        1. Acute bronchitis  Most likely, it is viral and possibly inflammatory, more related to allergies. The COVID-19 and strep tests are negative. She is given steroids in the form of Medrol Dosepak and Tessalon Perles. If she is not improving in 48 hours, she will add Bactrim.    The patient will follow up as scheduled.    Plan of care reviewed with patient at the conclusion of today's  visit. Education was provided regarding diagnosis, management, and any prescribed or recommended OTC medications.Patient verbalizes understanding of and agreement with management plan.       Lloyd Duncan MD     Transcribed from ambient dictation for Lloyd Duncan MD by Santosh Gallardo.  04/04/23   12:29 EDT    Patient or patient representative verbalized consent to the visit recording.  I have personally performed the services described in this document as transcribed by the above individual, and it is both accurate and complete.

## 2023-04-21 NOTE — PROGRESS NOTES
Patient was scheduled as a telehealth visit.  Patient was unable to connect to video via Videostrip or epic video.  Appointment canceled and rescheduled another day .

## 2023-05-30 ENCOUNTER — OFFICE VISIT (OUTPATIENT)
Dept: INTERNAL MEDICINE | Facility: CLINIC | Age: 63
End: 2023-05-30

## 2023-05-30 ENCOUNTER — LAB (OUTPATIENT)
Dept: LAB | Facility: HOSPITAL | Age: 63
End: 2023-05-30

## 2023-05-30 VITALS
BODY MASS INDEX: 30.96 KG/M2 | TEMPERATURE: 97.3 F | DIASTOLIC BLOOD PRESSURE: 96 MMHG | HEART RATE: 80 BPM | HEIGHT: 61 IN | WEIGHT: 164 LBS | SYSTOLIC BLOOD PRESSURE: 154 MMHG

## 2023-05-30 DIAGNOSIS — Z98.890 HISTORY OF GASTRIC SURGERY: ICD-10-CM

## 2023-05-30 DIAGNOSIS — E53.8 VITAMIN B12 DEFICIENCY: ICD-10-CM

## 2023-05-30 DIAGNOSIS — I10 BENIGN ESSENTIAL HYPERTENSION: ICD-10-CM

## 2023-05-30 DIAGNOSIS — Z98.84 HISTORY OF BARIATRIC SURGERY: ICD-10-CM

## 2023-05-30 DIAGNOSIS — Z12.31 ENCOUNTER FOR SCREENING MAMMOGRAM FOR MALIGNANT NEOPLASM OF BREAST: ICD-10-CM

## 2023-05-30 DIAGNOSIS — E55.9 VITAMIN D DEFICIENCY: ICD-10-CM

## 2023-05-30 DIAGNOSIS — Z00.00 ROUTINE MEDICAL EXAM: ICD-10-CM

## 2023-05-30 DIAGNOSIS — Z12.2 SCREENING FOR LUNG CANCER: ICD-10-CM

## 2023-05-30 DIAGNOSIS — E03.9 ACQUIRED HYPOTHYROIDISM: ICD-10-CM

## 2023-05-30 DIAGNOSIS — E78.2 MIXED HYPERLIPIDEMIA: ICD-10-CM

## 2023-05-30 DIAGNOSIS — F33.42 RECURRENT MAJOR DEPRESSIVE DISORDER, IN FULL REMISSION: ICD-10-CM

## 2023-05-30 DIAGNOSIS — Z78.0 POST-MENOPAUSAL: ICD-10-CM

## 2023-05-30 DIAGNOSIS — Z00.00 WELCOME TO MEDICARE PREVENTIVE VISIT: Primary | ICD-10-CM

## 2023-05-30 PROBLEM — F32.A DEPRESSION: Status: RESOLVED | Noted: 2019-06-27 | Resolved: 2023-05-30

## 2023-05-30 LAB
25(OH)D3 SERPL-MCNC: 49.5 NG/ML (ref 30–100)
ALBUMIN SERPL-MCNC: 4.1 G/DL (ref 3.5–5.2)
ALBUMIN UR-MCNC: <1.2 MG/DL
ALBUMIN/GLOB SERPL: 1.6 G/DL
ALP SERPL-CCNC: 84 U/L (ref 39–117)
ALT SERPL W P-5'-P-CCNC: 13 U/L (ref 1–33)
ANION GAP SERPL CALCULATED.3IONS-SCNC: 12.8 MMOL/L (ref 5–15)
AST SERPL-CCNC: 16 U/L (ref 1–32)
BASOPHILS # BLD AUTO: 0.05 10*3/MM3 (ref 0–0.2)
BASOPHILS NFR BLD AUTO: 0.8 % (ref 0–1.5)
BILIRUB SERPL-MCNC: 0.4 MG/DL (ref 0–1.2)
BUN SERPL-MCNC: 20 MG/DL (ref 8–23)
BUN/CREAT SERPL: 28.6 (ref 7–25)
CALCIUM SPEC-SCNC: 9.7 MG/DL (ref 8.6–10.5)
CALCIUM SPEC-SCNC: 9.8 MG/DL (ref 8.6–10.5)
CHLORIDE SERPL-SCNC: 104 MMOL/L (ref 98–107)
CHOLEST SERPL-MCNC: 178 MG/DL (ref 0–200)
CO2 SERPL-SCNC: 25.2 MMOL/L (ref 22–29)
CREAT SERPL-MCNC: 0.7 MG/DL (ref 0.57–1)
DEPRECATED RDW RBC AUTO: 41.7 FL (ref 37–54)
EGFRCR SERPLBLD CKD-EPI 2021: 96.1 ML/MIN/1.73
EOSINOPHIL # BLD AUTO: 0.25 10*3/MM3 (ref 0–0.4)
EOSINOPHIL NFR BLD AUTO: 4 % (ref 0.3–6.2)
ERYTHROCYTE [DISTWIDTH] IN BLOOD BY AUTOMATED COUNT: 12.7 % (ref 12.3–15.4)
FERRITIN SERPL-MCNC: 215 NG/ML (ref 13–150)
FOLATE SERPL-MCNC: 8.99 NG/ML (ref 4.78–24.2)
GLOBULIN UR ELPH-MCNC: 2.6 GM/DL
GLUCOSE SERPL-MCNC: 91 MG/DL (ref 65–99)
HCT VFR BLD AUTO: 40 % (ref 34–46.6)
HDLC SERPL-MCNC: 62 MG/DL (ref 40–60)
HGB BLD-MCNC: 13.6 G/DL (ref 12–15.9)
IMM GRANULOCYTES # BLD AUTO: 0.02 10*3/MM3 (ref 0–0.05)
IMM GRANULOCYTES NFR BLD AUTO: 0.3 % (ref 0–0.5)
IRON 24H UR-MRATE: 111 MCG/DL (ref 37–145)
IRON SATN MFR SERPL: 29 % (ref 20–50)
LDLC SERPL CALC-MCNC: 102 MG/DL (ref 0–100)
LDLC/HDLC SERPL: 1.62 {RATIO}
LYMPHOCYTES # BLD AUTO: 1.86 10*3/MM3 (ref 0.7–3.1)
LYMPHOCYTES NFR BLD AUTO: 29.7 % (ref 19.6–45.3)
MAGNESIUM SERPL-MCNC: 2 MG/DL (ref 1.6–2.4)
MCH RBC QN AUTO: 30.7 PG (ref 26.6–33)
MCHC RBC AUTO-ENTMCNC: 34 G/DL (ref 31.5–35.7)
MCV RBC AUTO: 90.3 FL (ref 79–97)
MONOCYTES # BLD AUTO: 0.73 10*3/MM3 (ref 0.1–0.9)
MONOCYTES NFR BLD AUTO: 11.6 % (ref 5–12)
NEUTROPHILS NFR BLD AUTO: 3.36 10*3/MM3 (ref 1.7–7)
NEUTROPHILS NFR BLD AUTO: 53.6 % (ref 42.7–76)
NRBC BLD AUTO-RTO: 0 /100 WBC (ref 0–0.2)
PHOSPHATE SERPL-MCNC: 3.1 MG/DL (ref 2.5–4.5)
PLATELET # BLD AUTO: 272 10*3/MM3 (ref 140–450)
PMV BLD AUTO: 9.9 FL (ref 6–12)
POTASSIUM SERPL-SCNC: 4.4 MMOL/L (ref 3.5–5.2)
PROT SERPL-MCNC: 6.7 G/DL (ref 6–8.5)
PTH-INTACT SERPL-MCNC: 37.1 PG/ML (ref 15–65)
RBC # BLD AUTO: 4.43 10*6/MM3 (ref 3.77–5.28)
SODIUM SERPL-SCNC: 142 MMOL/L (ref 136–145)
T3FREE SERPL-MCNC: 4.73 PG/ML (ref 2–4.4)
T4 FREE SERPL-MCNC: 1.61 NG/DL (ref 0.93–1.7)
TIBC SERPL-MCNC: 387 MCG/DL (ref 298–536)
TRANSFERRIN SERPL-MCNC: 260 MG/DL (ref 200–360)
TRIGL SERPL-MCNC: 78 MG/DL (ref 0–150)
TSH SERPL DL<=0.05 MIU/L-ACNC: 0.01 UIU/ML (ref 0.27–4.2)
VIT B12 BLD-MCNC: 506 PG/ML (ref 211–946)
VLDLC SERPL-MCNC: 14 MG/DL (ref 5–40)
WBC NRBC COR # BLD: 6.27 10*3/MM3 (ref 3.4–10.8)

## 2023-05-30 PROCEDURE — 83970 ASSAY OF PARATHORMONE: CPT

## 2023-05-30 PROCEDURE — 82306 VITAMIN D 25 HYDROXY: CPT

## 2023-05-30 PROCEDURE — 82746 ASSAY OF FOLIC ACID SERUM: CPT

## 2023-05-30 PROCEDURE — 36415 COLL VENOUS BLD VENIPUNCTURE: CPT

## 2023-05-30 PROCEDURE — 84446 ASSAY OF VITAMIN E: CPT

## 2023-05-30 PROCEDURE — 84466 ASSAY OF TRANSFERRIN: CPT

## 2023-05-30 PROCEDURE — 82310 ASSAY OF CALCIUM: CPT

## 2023-05-30 PROCEDURE — 84425 ASSAY OF VITAMIN B-1: CPT

## 2023-05-30 PROCEDURE — 83540 ASSAY OF IRON: CPT

## 2023-05-30 PROCEDURE — 84100 ASSAY OF PHOSPHORUS: CPT

## 2023-05-30 PROCEDURE — 84481 FREE ASSAY (FT-3): CPT

## 2023-05-30 PROCEDURE — 84590 ASSAY OF VITAMIN A: CPT

## 2023-05-30 PROCEDURE — 82728 ASSAY OF FERRITIN: CPT

## 2023-05-30 PROCEDURE — 80061 LIPID PANEL: CPT

## 2023-05-30 PROCEDURE — 83735 ASSAY OF MAGNESIUM: CPT

## 2023-05-30 PROCEDURE — 82607 VITAMIN B-12: CPT

## 2023-05-30 PROCEDURE — 82043 UR ALBUMIN QUANTITATIVE: CPT

## 2023-05-30 PROCEDURE — 80050 GENERAL HEALTH PANEL: CPT

## 2023-05-30 PROCEDURE — 84439 ASSAY OF FREE THYROXINE: CPT

## 2023-05-30 RX ORDER — OMEPRAZOLE 40 MG/1
1 CAPSULE, DELAYED RELEASE ORAL 2 TIMES DAILY
COMMUNITY
Start: 2023-05-22

## 2023-05-30 NOTE — PROGRESS NOTES
QUICK REFERENCE INFORMATION:  The ABCs of the Annual Wellness Visit    Annual Wellness visit    HEALTH RISK ASSESSMENT    1960    Recent History  No hospitalization(s) within the last year..        Current Medical Providers:  Patient Care Team:  Phuong Stringer PA-C as PCP - General (Internal Medicine)  Phuong Stringer PA-C as Physician Assistant (Internal Medicine)  Benito Sandoval MD (Inactive) as Consulting Physician (Gynecology)  Jarod Cavazos MD as Consulting Physician (Gastroenterology)  Wali Mercedes MD as Surgeon (Vascular Surgery)        Smoking Status:  Social History     Tobacco Use   Smoking Status Former   • Packs/day: 1.00   • Years: 20.00   • Pack years: 20.00   • Types: Cigarettes   • Quit date: 2000   • Years since quittin.4   Smokeless Tobacco Never       Alcohol Consumption:  Social History     Substance and Sexual Activity   Alcohol Use Yes   • Alcohol/week: 1.0 - 2.0 standard drink   • Types: 1 - 2 Glasses of wine per week       Depression Screen:       2023     9:34 AM   PHQ-2/PHQ-9 Depression Screening   Little Interest or Pleasure in Doing Things 0-->not at all   Feeling Down, Depressed or Hopeless 0-->not at all   PHQ-9: Brief Depression Severity Measure Score 0       Health Habits:              Recent Lab Results:  CMP:  Lab Results   Component Value Date    BUN 16 2022    CREATININE 0.80 2022    EGFRIFNONA 90 2021    BCR 20.0 2022     2022    K 4.2 2022    CO2 27.0 2022    CALCIUM 9.0 2022    ALBUMIN 4.40 2022    BILITOT 0.3 2022    ALKPHOS 90 2022    AST 17 2022    ALT 13 2022     Lipid Panel:  Lab Results   Component Value Date    CHOL 198 2022    TRIG 95 2022    HDL 53 2022    VLDL 17 2022    LDLHDL 2.38 2022     HbA1c:  Lab Results   Component Value Date    HGBA1C 5.88 (H) 2021           Age-appropriate Screening  Schedule:  Refer to the list below for future screening recommendations based on patient's age, sex and/or medical conditions. Orders for these recommended tests are listed in the plan section. The patient has been provided with a written plan.    Health Maintenance   Topic Date Due   • DXA SCAN  Never done   • COVID-19 Vaccine (1) Never done   • ZOSTER VACCINE (1 of 2) Never done   • HEPATITIS C SCREENING  Never done   • PAP SMEAR  03/11/2023   • Pneumococcal Vaccine 65+ (1 - PCV) Never done   • INFLUENZA VACCINE  08/01/2023   • ANNUAL PHYSICAL  08/26/2023   • LIPID PANEL  08/31/2023   • MAMMOGRAM  10/13/2023   • COLORECTAL CANCER SCREENING  06/11/2025   • TDAP/TD VACCINES (2 - Td or Tdap) 08/26/2026        Subjective   History of Present Illness    Aleyda Louise is a 63 y.o. female who presents for an Annual Wellness Visit.  Past medical history significant for history of bariatric surgery, hypertension, seasonal allergies, depression, hypothyroidism.  She also needs labs by her bariatric surgeon today.  Reports that she is getting ready to go through a divorce.  She has been  for 35 years.  Feels that her Lexapro works well for her.  She denies chest pain, shortness of breath, palpitations, or headaches.    The following portions of the patient's history were reviewed and updated as appropriate: allergies, current medications, past family history, past medical history, past social history, past surgical history and problem list.    Outpatient Medications Prior to Visit   Medication Sig Dispense Refill   • CALCIUM PO Take 1 tablet by mouth Daily.     • Cholecalciferol (VITAMIN D3 PO) Take 1 capsule by mouth Daily.     • escitalopram (LEXAPRO) 10 MG tablet TAKE 1 TABLET BY MOUTH EVERY DAY 90 tablet 1   • levocetirizine (XYZAL) 5 MG tablet Take 1 tablet by mouth At Night As Needed for Allergies.     • lisinopril (PRINIVIL,ZESTRIL) 10 MG tablet Take 1 tablet by mouth Daily. 90 tablet 2   • Multiple  Vitamins-Minerals (MULTIVITAMIN ADULT PO) Take 1 tablet by mouth Daily.     • omeprazole (priLOSEC) 40 MG capsule Take 1 capsule by mouth 2 (Two) Times a Day.     • pramipexole (MIRAPEX) 1 MG tablet TAKE 2 TABLETS BY MOUTH EVERY NIGHT 180 tablet 3   • thyroid ([place] Thyroid) 240 MG tablet Take 1 tablet by mouth Daily. 90 tablet 1   • tiZANidine (ZANAFLEX) 4 MG tablet Take 1 tablet by mouth At Night As Needed for Muscle Spasms.     • benzonatate (Tessalon Perles) 100 MG capsule Take 1 capsule by mouth 3 (Three) Times a Day As Needed for Cough. 30 capsule 0   • methylPREDNISolone (MEDROL) 4 MG dose pack Take as directed on package instructions. 21 tablet 0   • sulfamethoxazole-trimethoprim (BACTRIM DS,SEPTRA DS) 800-160 MG per tablet Take 1 tablet by mouth 2 (Two) Times a Day. 14 tablet 0     No facility-administered medications prior to visit.       Patient Active Problem List   Diagnosis   • Lower extremity edema   • Hypothyroid   • GERD (gastroesophageal reflux disease)   • Hypertensive heart disease   • Allergic rhinitis   • Benign essential hypertension   • Bronchitis with bronchospasm   • Low back pain at multiple sites   • Mixed hyperlipidemia   • Recurrent major depressive disorder, in full remission   • Weight loss   • Restless leg syndrome   • Hypothyroidism   • Menopause   • Primary insomnia   • Acute right hip pain   • Body mass index (BMI) of 36.0 to 36.9 in adult   • Hypertension   • History of bariatric surgery   • Routine medical exam   • Vitamin B12 deficiency   • Vitamin D deficiency   • Chronic right shoulder pain   • Right hand pain   • Welcome to Medicare preventive visit   • Screening for lung cancer   • Encounter for screening mammogram for malignant neoplasm of breast       Advance Care Planning:  ACP discussion was held with the patient during this visit. Patient does not have an advance directive, information provided.    Identification of Risk Factors:  Risk factors include: Advance  Directive Discussion.    Review of Systems   Constitutional: Negative for chills, fatigue and fever.   HENT: Negative for congestion, ear pain and sinus pressure.    Respiratory: Negative for cough, chest tightness, shortness of breath and wheezing.    Cardiovascular: Negative for chest pain and palpitations.   Gastrointestinal: Negative for abdominal pain, blood in stool and constipation.   Skin: Negative for color change.   Allergic/Immunologic: Negative for environmental allergies.   Neurological: Negative for dizziness, speech difficulty and headaches.   Psychiatric/Behavioral: Negative for confusion. The patient is not nervous/anxious.        Compared to one year ago, the patient feels her physical health is better.  Compared to one year ago, the patient feels her mental health is better.    Objective     Physical Exam  Vitals reviewed.   Constitutional:       Appearance: Normal appearance. She is well-developed.   HENT:      Head: Normocephalic and atraumatic.      Right Ear: Hearing, tympanic membrane, ear canal and external ear normal.      Left Ear: Hearing, tympanic membrane, ear canal and external ear normal.      Nose: Nose normal.      Mouth/Throat:      Pharynx: Uvula midline.   Eyes:      General: Lids are normal.      Conjunctiva/sclera: Conjunctivae normal.      Pupils: Pupils are equal, round, and reactive to light.   Cardiovascular:      Rate and Rhythm: Normal rate and regular rhythm.      Heart sounds: Normal heart sounds.   Pulmonary:      Effort: Pulmonary effort is normal.      Breath sounds: Normal breath sounds.   Abdominal:      General: Bowel sounds are normal.      Palpations: Abdomen is soft.   Musculoskeletal:         General: Normal range of motion.      Cervical back: Full passive range of motion without pain, normal range of motion and neck supple.   Skin:     General: Skin is warm and dry.   Neurological:      Mental Status: She is alert and oriented to person, place, and time.      " Deep Tendon Reflexes: Reflexes are normal and symmetric.   Psychiatric:         Speech: Speech normal.         Behavior: Behavior normal.         Thought Content: Thought content normal.         Judgment: Judgment normal.           Vitals:    05/30/23 0924   BP: 154/96   BP Location: Left arm   Patient Position: Sitting   Cuff Size: Adult   Pulse: 80   Temp: 97.3 °F (36.3 °C)   TempSrc: Temporal   Weight: 74.4 kg (164 lb)   Height: 156 cm (61.42\")   PainSc: 0-No pain       ECG 12 Lead    Date/Time: 5/30/2023 10:15 AM  Performed by: Phuong Stringer PA-C  Authorized by: Phuong Stringer PA-C   Comparison: not compared with previous ECG   Rhythm: sinus rhythm  BPM: 75    Clinical impression: normal ECG  Comments: Normal EKG was sinus rhythm of 75 beats per minutes.          BMI is >= 30 and <35. (Class 1 Obesity). The following options were offered after discussion;: weight loss educational material (shared in after visit summary), exercise counseling/recommendations and nutrition counseling/recommendations      CMP:  Lab Results   Component Value Date    BUN 16 08/31/2022    CREATININE 0.80 08/31/2022    EGFRIFNONA 90 05/31/2021    BCR 20.0 08/31/2022     08/31/2022    K 4.2 08/31/2022    CO2 27.0 08/31/2022    CALCIUM 9.0 08/31/2022    ALBUMIN 4.40 08/31/2022    BILITOT 0.3 08/31/2022    ALKPHOS 90 08/31/2022    AST 17 08/31/2022    ALT 13 08/31/2022     HbA1c:  Lab Results   Component Value Date    HGBA1C 5.88 (H) 03/01/2021     Microalbumin:  Lab Results   Component Value Date    MICROALBUR 1.3 08/31/2022     Lipid Panel  Lab Results   Component Value Date    CHOL 198 08/31/2022    TRIG 95 08/31/2022    HDL 53 08/31/2022     (H) 08/31/2022    AST 17 08/31/2022    ALT 13 08/31/2022       Assessment & Plan   Patient Self-Management and Personalized Health Advice  The patient has been provided with information about: weight management and preventive services including:   · Annual Wellness " Visit (AWV).    Diagnoses and all orders for this visit:    1. Welcome to Medicare preventive visit (Primary)  Overview:  Order for bone density and mammogram sent.  Order for fasting labs sent.  Patient will receive Pneumovax 20 today.  Low-dose CT scan for history of tobacco abuse ordered.    Orders:  -     ECG 12 Lead    2. Routine medical exam  Overview:  She will consider Shingrix.  Fasting labs today.  Discussed importance of regular cardiovascular exercise.      3. Mixed hyperlipidemia  Overview:  Discussed importance of a low-fat, low-cholesterol diet.  Patient is encouraged to perform regular cardiovascular exercise most days of the week.    Orders:  -     Lipid Panel; Future  -     MicroAlbumin, Urine, Random - Urine, Clean Catch; Future  -     ECG 12 Lead    4. Benign essential hypertension  Overview:  Patient encouraged to continue with weight loss.  Discussed importance of cutting back on sodium. Monitor blood pressures at home.  She did not take her blood pressure medication this morning.    Orders:  -     CBC & Differential; Future  -     Comprehensive Metabolic Panel; Future  -     ECG 12 Lead    5. Vitamin D deficiency  -     Vitamin D,25-Hydroxy; Future    6. Vitamin B12 deficiency  -     Vitamin B12; Future    7. Acquired hypothyroidism  Overview:  [place] Thyroid 240 mg every day as directed.  We will recheck this today.    Orders:  -     TSH; Future  -     T4, Free; Future  -     T3, Free; Future    8. Recurrent major depressive disorder, in full remission  Overview:  Continue Lexapro 10 mg tablets every day.This works well for her.      9. History of bariatric surgery  Overview:  Order for vitamins today per bariatric surgeon.    Orders:  -     Iron Profile; Future  -     Ferritin; Future  -     Folate; Future  -     Magnesium; Future  -     PTH, Intact & Calcium; Future  -     Phosphorus; Future  -     Vitamin A; Future  -     Vitamin B1, Whole Blood; Future  -     Vitamin E; Future    10.  Screening for lung cancer  -      CT Chest Low Dose Cancer Screening WO; Future    11. Encounter for screening mammogram for malignant neoplasm of breast  -     Mammo Screening Digital Tomosynthesis Bilateral With CAD; Future    12. Post-menopausal  -     DEXA Bone Density Axial; Future    13. History of gastric surgery  Overview:  Order for vitamins today per bariatric surgeon.      Other orders  -     Pneumococcal Conjugate Vaccine 20-Valent All        Patient Instructions       Medicare Wellness  Personal Prevention Plan of Service     Date of Office Visit:    Encounter Provider:  Phuong Stringer PA-C  Place of Service:  [facility]  Patient Name: Aleyda Louise  :  1960    As part of the Medicare Wellness portion of your visit today, we are providing you with this personalized preventive plan of services (PPPS). This plan is based upon recommendations of the [place] Preventive Services Task Force (USPSTF) and the Advisory Committee on Immunization Practices (ACIP).    This lists the preventive care services that should be considered, and provides dates of when you are due. Items listed as completed are up-to-date and do not require any further intervention.    Health Maintenance   Topic Date Due   • DXA SCAN  Never done   • COVID-19 Vaccine (1) Never done   • ZOSTER VACCINE (1 of 2) Never done   • HEPATITIS C SCREENING  Never done   • PAP SMEAR  2023   • Pneumococcal Vaccine 65+ (1 - PCV) Never done   • INFLUENZA VACCINE  2023   • ANNUAL PHYSICAL  2023   • LIPID PANEL  2023   • MAMMOGRAM  10/13/2023   • COLORECTAL CANCER SCREENING  2025   • TDAP/TD VACCINES (2 - Td or Tdap) 2026       Orders Placed This Encounter   Procedures   • Comprehensive Metabolic Panel     Standing Status:   Future     Standing Expiration Date:   2024     Order Specific Question:   Release to patient     Answer:   Routine Release   • Lipid Panel      Standing Status:   Future     Standing Expiration Date:   5/30/2024   • MicroAlbumin, Urine, Random - Urine, Clean Catch     Standing Status:   Future     Standing Expiration Date:   5/30/2024     Order Specific Question:   Release to patient     Answer:   Routine Release   • TSH     Standing Status:   Future     Standing Expiration Date:   5/30/2024     Order Specific Question:   Release to patient     Answer:   Routine Release   • T4, Free     Standing Status:   Future     Standing Expiration Date:   5/30/2024     Order Specific Question:   Release to patient     Answer:   Routine Release   • T3, Free     Standing Status:   Future     Standing Expiration Date:   5/30/2024     Order Specific Question:   Release to patient     Answer:   Routine Release   • Vitamin B12     Standing Status:   Future     Standing Expiration Date:   5/30/2024     Order Specific Question:   Release to patient     Answer:   Routine Release   • Vitamin D,25-Hydroxy     Standing Status:   Future     Standing Expiration Date:   5/30/2024     Order Specific Question:   Release to patient     Answer:   Routine Release   • Iron Profile     Standing Status:   Future     Standing Expiration Date:   5/30/2024   • Ferritin     Standing Status:   Future     Standing Expiration Date:   5/30/2024     Order Specific Question:   Release to patient     Answer:   Routine Release   • Folate     Standing Status:   Future     Standing Expiration Date:   5/30/2024     Order Specific Question:   Release to patient     Answer:   Routine Release   • Magnesium     Standing Status:   Future     Standing Expiration Date:   5/30/2024     Order Specific Question:   Release to patient     Answer:   Routine Release   • PTH, Intact & Calcium     Standing Status:   Future     Standing Expiration Date:   5/30/2024     Order Specific Question:   Release to patient     Answer:   Routine Release   • Phosphorus     Standing Status:   Future     Standing Expiration Date:    5/30/2024     Order Specific Question:   Release to patient     Answer:   Routine Release   • Vitamin A     Standing Status:   Future     Standing Expiration Date:   5/30/2024     Order Specific Question:   Release to patient     Answer:   Routine Release   • Vitamin B1, Whole Blood     Standing Status:   Future     Standing Expiration Date:   5/30/2024     Order Specific Question:   Release to patient     Answer:   Routine Release   • Vitamin E     Standing Status:   Future     Standing Expiration Date:   5/30/2024     Order Specific Question:   Release to patient     Answer:   Routine Release   • CBC & Differential     Standing Status:   Future     Standing Expiration Date:   5/30/2024     Order Specific Question:   Manual Differential     Answer:   No       No follow-ups on file.        BMI for Adults  What is BMI?  Body mass index (BMI) is a number that is calculated from a person's weight and height. BMI can help estimate how much of a person's weight is composed of fat. BMI does not measure body fat directly. Rather, it is an alternative to procedures that directly measure body fat, which can be difficult and expensive.  BMI can help identify people who may be at higher risk for certain medical problems.  What are BMI measurements used for?  BMI is used as a screening tool to identify possible weight problems. It helps determine whether a person is obese, overweight, a healthy weight, or underweight.  BMI is useful for:  • Identifying a weight problem that may be related to a medical condition or may increase the risk for medical problems.  • Promoting changes, such as changes in diet and exercise, to help reach a healthy weight. BMI screening can be repeated to see if these changes are working.  How is BMI calculated?  BMI involves measuring your weight in relation to your height. Both height and weight are measured, and the BMI is calculated from those numbers. This can be done either in English (U[place] or  "metric measurements. Note that charts and online BMI calculators are available to help you find your BMI quickly and easily without having to do these calculations yourself.  To calculate your BMI in English (U.S[place] measurements:    1. Measure your weight in pounds (lb).  2. Multiply the number of pounds by 703.  ? For example, for a person who weighs 180 lb, multiply that number by 703, which equals 126,540.  3. Measure your height in inches. Then multiply that number by itself to get a measurement called \"inches squared.\"  ? For example, for a person who is 70 inches tall, the \"inches squared\" measurement is 70 inches x 70 inches, which equals 4,900 inches squared.  4. Divide the total from step 2 (number of lb x 703) by the total from step 3 (inches squared): 126,540 ÷ 4,900 = 25.8. This is your BMI.  To calculate your BMI in metric measurements:  1. Measure your weight in kilograms (kg).  2. Measure your height in meters (m). Then multiply that number by itself to get a measurement called \"meters squared.\"  ? For example, for a person who is 1.75 m tall, the \"meters squared\" measurement is 1.75 m x 1.75 m, which is equal to 3.1 meters squared.  3. Divide the number of kilograms (your weight) by the meters squared number. In this example: 70 ÷ 3.1 = 22.6. This is your BMI.  What do the results mean?  BMI charts are used to identify whether you are underweight, normal weight, overweight, or obese. The following guidelines will be used:  • Underweight: BMI less than 18.5.  • Normal weight: BMI between 18.5 and 24.9.  • Overweight: BMI between 25 and 29.9.  • Obese: BMI of 30 or above.  Keep these notes in mind:  • Weight includes both fat and muscle, so someone with a muscular build, such as an athlete, may have a BMI that is higher than 24.9. In cases like these, BMI is not an accurate measure of body fat.  • To determine if excess body fat is the cause of a BMI of 25 or higher, further assessments may need to be " done by a health care provider.  • BMI is usually interpreted in the same way for men and women.  Where to find more information  For more information about BMI, including tools to quickly calculate your BMI, go to these websites:  • Centers for Disease Control and Prevention: www.cdc.gov  • American Heart Association: www.heart.org  • National Heart, Lung, and Blood [place]: www.nhlbi.nih.gov  Summary  • Body mass index (BMI) is a number that is calculated from a person's weight and height.  • BMI may help estimate how much of a person's weight is composed of fat. BMI can help identify those who may be at higher risk for certain medical problems.  • BMI can be measured using English measurements or metric measurements.  • BMI charts are used to identify whether you are underweight, normal weight, overweight, or obese.  This information is not intended to replace advice given to you by your health care provider. Make sure you discuss any questions you have with your health care provider.  Document Revised: 09/09/2020 Document Reviewed: 07/17/2020  [employer] Patient Education © 2022 [employer]  Advance Directive  Advance directives are legal documents that allow you to make decisions about your health care and medical treatment in case you become unable to communicate for yourself. Advance directives let your wishes be known to family, friends, and health care providers.  Discussing and writing advance directives should happen over time rather than all at once. Advance directives can be changed and updated at any time. There are different types of advance directives, such as:  • Medical power of .  • Living will.  • Do not resuscitate (DNR) order or do not attempt resuscitation (DNAR) order.  Health care proxy and medical power of   A health care proxy is also called a health care agent. This person is appointed to make medical decisions for you when you are unable to make decisions for yourself.  Generally, people ask a trusted friend or family member to act as their proxy and represent their preferences. Make sure you have an agreement with your trusted person to act as your proxy. A proxy may have to make a medical decision on your behalf if your wishes are not known.  A medical power of , also called a durable power of  for health care, is a legal document that names your health care proxy. Depending on the laws in your state, the document may need to be:  • Signed.  • Notarized.  • Dated.  • Copied.  • Witnessed.  • Incorporated into your medical record.  You may also want to appoint a trusted person to manage your money in the event you are unable to do so. This is called a durable power of  for finances. It is a separate legal document from the durable power of  for health care. You may choose your health care proxy or someone different to act as your agent in money matters.  If you do not appoint a proxy, or there is a concern that the proxy is not acting in your best interest, a court may appoint a guardian to act on your behalf.  Living will  A living will is a set of instructions that state your wishes about medical care when you cannot express them yourself. Health care providers should keep a copy of your living will in your medical record. You may want to give a copy to family members or friends. To alert caregivers in case of an emergency, you can place a card in your wallet to let them know that you have a living will and where they can find it. A living will is used if you become:  • Terminally ill.  • Disabled.  • Unable to communicate or make decisions.  The following decisions should be included in your living will:  • To use or not to use life support equipment, such as dialysis machines and breathing machines (ventilators).  • Whether you want a DNR or DNAR order. This tells health care providers not to use cardiopulmonary resuscitation (CPR) if breathing  or heartbeat stops.  • To use or not to use tube feeding.  • To be given or not to be given food and fluids.  • Whether you want comfort (palliative) care when the goal becomes comfort rather than a cure.  • Whether you want to donate your organs and tissues.  A living will does not give instructions for distributing your money and property if you should pass away.  DNR or DNAR  A DNR or DNAR order is a request not to have CPR in the event that your heart stops beating or you stop breathing. If a DNR or DNAR order has not been made and shared, a health care provider will try to help any patient whose heart has stopped or who has stopped breathing. If you plan to have surgery, talk with your health care provider about how your DNR or DNAR order will be followed if problems occur.  What if I do not have an advance directive?  Some states assign family decision makers to act on your behalf if you do not have an advance directive. Each state has its own laws about advance directives. You may want to check with your health care provider, , or state representative about the laws in your state.  Summary  • Advance directives are legal documents that allow you to make decisions about your health care and medical treatment in case you become unable to communicate for yourself.  • The process of discussing and writing advance directives should happen over time. You can change and update advance directives at any time.  • Advance directives may include a medical power of , a living will, and a DNR or DNAR order.  This information is not intended to replace advice given to you by your health care provider. Make sure you discuss any questions you have with your health care provider.  Document Revised: 09/21/2021 Document Reviewed: 09/21/2021  Elsevier Patient Education © 2022 [employer]        Outpatient Encounter Medications as of 5/30/2023   Medication Sig Dispense Refill   • CALCIUM PO Take 1 tablet by  mouth Daily.     • Cholecalciferol (VITAMIN D3 PO) Take 1 capsule by mouth Daily.     • escitalopram (LEXAPRO) 10 MG tablet TAKE 1 TABLET BY MOUTH EVERY DAY 90 tablet 1   • levocetirizine (XYZAL) 5 MG tablet Take 1 tablet by mouth At Night As Needed for Allergies.     • lisinopril (PRINIVIL,ZESTRIL) 10 MG tablet Take 1 tablet by mouth Daily. 90 tablet 2   • Multiple Vitamins-Minerals (MULTIVITAMIN ADULT PO) Take 1 tablet by mouth Daily.     • omeprazole (priLOSEC) 40 MG capsule Take 1 capsule by mouth 2 (Two) Times a Day.     • pramipexole (MIRAPEX) 1 MG tablet TAKE 2 TABLETS BY MOUTH EVERY NIGHT 180 tablet 3   • thyroid ([place] Thyroid) 240 MG tablet Take 1 tablet by mouth Daily. 90 tablet 1   • tiZANidine (ZANAFLEX) 4 MG tablet Take 1 tablet by mouth At Night As Needed for Muscle Spasms.     • [DISCONTINUED] benzonatate (Tessalon Perles) 100 MG capsule Take 1 capsule by mouth 3 (Three) Times a Day As Needed for Cough. 30 capsule 0   • [DISCONTINUED] methylPREDNISolone (MEDROL) 4 MG dose pack Take as directed on package instructions. 21 tablet 0   • [DISCONTINUED] sulfamethoxazole-trimethoprim (BACTRIM DS,SEPTRA DS) 800-160 MG per tablet Take 1 tablet by mouth 2 (Two) Times a Day. 14 tablet 0     No facility-administered encounter medications on file as of 5/30/2023.       Reviewed use of high risk medication in the elderly: yes  Reviewed for potential of harmful drug interactions in the elderly: yes    Follow Up:  Return in about 6 months (around 11/30/2023).     An After Visit Summary and PPPS with all of these plans were given to the patient.         I spent 34 minutes caring for Aleyda on this date of service. This time includes time spent by me in the following activities:preparing for the visit, reviewing tests, obtaining and/or reviewing a separately obtained history, performing a medically appropriate examination and/or evaluation , counseling and educating the patient/family/caregiver, ordering  medications, tests, or procedures, referring and communicating with other health care professionals  and documenting information in the medical record    Phuong Stringer PA-C    Note: Part of this note may be an electronic transcription/translation of spoken language to printed text using the Dragon Dictation System.

## 2023-05-30 NOTE — PATIENT INSTRUCTIONS
Medicare Wellness  Personal Prevention Plan of Service     Date of Office Visit:    Encounter Provider:  Phuong Stringer PA-C  Place of Service:  [facility]  Patient Name: Aledya Louise  :  1960    As part of the Medicare Wellness portion of your visit today, we are providing you with this personalized preventive plan of services (PPPS). This plan is based upon recommendations of the [place] Preventive Services Task Force (USPSTF) and the Advisory Committee on Immunization Practices (ACIP).    This lists the preventive care services that should be considered, and provides dates of when you are due. Items listed as completed are up-to-date and do not require any further intervention.    Health Maintenance   Topic Date Due    DXA SCAN  Never done    COVID-19 Vaccine (1) Never done    ZOSTER VACCINE (1 of 2) Never done    HEPATITIS C SCREENING  Never done    PAP SMEAR  2023    Pneumococcal Vaccine 65+ (1 - PCV) Never done    INFLUENZA VACCINE  2023    ANNUAL PHYSICAL  2023    LIPID PANEL  2023    MAMMOGRAM  10/13/2023    COLORECTAL CANCER SCREENING  2025    TDAP/TD VACCINES (2 - Td or Tdap) 2026       Orders Placed This Encounter   Procedures    Comprehensive Metabolic Panel     Standing Status:   Future     Standing Expiration Date:   2024     Order Specific Question:   Release to patient     Answer:   Routine Release    Lipid Panel     Standing Status:   Future     Standing Expiration Date:   2024    MicroAlbumin, Urine, Random - Urine, Clean Catch     Standing Status:   Future     Standing Expiration Date:   2024     Order Specific Question:   Release to patient     Answer:   Routine Release    TSH     Standing Status:   Future     Standing Expiration Date:   2024     Order Specific Question:   Release to patient     Answer:   Routine Release    T4, Free     Standing Status:   Future     Standing  Expiration Date:   5/30/2024     Order Specific Question:   Release to patient     Answer:   Routine Release    T3, Free     Standing Status:   Future     Standing Expiration Date:   5/30/2024     Order Specific Question:   Release to patient     Answer:   Routine Release    Vitamin B12     Standing Status:   Future     Standing Expiration Date:   5/30/2024     Order Specific Question:   Release to patient     Answer:   Routine Release    Vitamin D,25-Hydroxy     Standing Status:   Future     Standing Expiration Date:   5/30/2024     Order Specific Question:   Release to patient     Answer:   Routine Release    Iron Profile     Standing Status:   Future     Standing Expiration Date:   5/30/2024    Ferritin     Standing Status:   Future     Standing Expiration Date:   5/30/2024     Order Specific Question:   Release to patient     Answer:   Routine Release    Folate     Standing Status:   Future     Standing Expiration Date:   5/30/2024     Order Specific Question:   Release to patient     Answer:   Routine Release    Magnesium     Standing Status:   Future     Standing Expiration Date:   5/30/2024     Order Specific Question:   Release to patient     Answer:   Routine Release    PTH, Intact & Calcium     Standing Status:   Future     Standing Expiration Date:   5/30/2024     Order Specific Question:   Release to patient     Answer:   Routine Release    Phosphorus     Standing Status:   Future     Standing Expiration Date:   5/30/2024     Order Specific Question:   Release to patient     Answer:   Routine Release    Vitamin A     Standing Status:   Future     Standing Expiration Date:   5/30/2024     Order Specific Question:   Release to patient     Answer:   Routine Release    Vitamin B1, Whole Blood     Standing Status:   Future     Standing Expiration Date:   5/30/2024     Order Specific Question:   Release to patient     Answer:   Routine Release    Vitamin E     Standing Status:   Future     Standing Expiration  "Date:   5/30/2024     Order Specific Question:   Release to patient     Answer:   Routine Release    CBC & Differential     Standing Status:   Future     Standing Expiration Date:   5/30/2024     Order Specific Question:   Manual Differential     Answer:   No       No follow-ups on file.        BMI for Adults  What is BMI?  Body mass index (BMI) is a number that is calculated from a person's weight and height. BMI can help estimate how much of a person's weight is composed of fat. BMI does not measure body fat directly. Rather, it is an alternative to procedures that directly measure body fat, which can be difficult and expensive.  BMI can help identify people who may be at higher risk for certain medical problems.  What are BMI measurements used for?  BMI is used as a screening tool to identify possible weight problems. It helps determine whether a person is obese, overweight, a healthy weight, or underweight.  BMI is useful for:  Identifying a weight problem that may be related to a medical condition or may increase the risk for medical problems.  Promoting changes, such as changes in diet and exercise, to help reach a healthy weight. BMI screening can be repeated to see if these changes are working.  How is BMI calculated?  BMI involves measuring your weight in relation to your height. Both height and weight are measured, and the BMI is calculated from those numbers. This can be done either in English (U.S[place] or metric measurements. Note that charts and online BMI calculators are available to help you find your BMI quickly and easily without having to do these calculations yourself.  To calculate your BMI in English (U.S.) measurements:    Measure your weight in pounds (lb).  Multiply the number of pounds by 703.  For example, for a person who weighs 180 lb, multiply that number by 703, which equals 126,540.  Measure your height in inches. Then multiply that number by itself to get a measurement called \"inches " "squared.\"  For example, for a person who is 70 inches tall, the \"inches squared\" measurement is 70 inches x 70 inches, which equals 4,900 inches squared.  Divide the total from step 2 (number of lb x 703) by the total from step 3 (inches squared): 126,540 ÷ 4,900 = 25.8. This is your BMI.  To calculate your BMI in metric measurements:  Measure your weight in kilograms (kg).  Measure your height in meters (m). Then multiply that number by itself to get a measurement called \"meters squared.\"  For example, for a person who is 1.75 m tall, the \"meters squared\" measurement is 1.75 m x 1.75 m, which is equal to 3.1 meters squared.  Divide the number of kilograms (your weight) by the meters squared number. In this example: 70 ÷ 3.1 = 22.6. This is your BMI.  What do the results mean?  BMI charts are used to identify whether you are underweight, normal weight, overweight, or obese. The following guidelines will be used:  Underweight: BMI less than 18.5.  Normal weight: BMI between 18.5 and 24.9.  Overweight: BMI between 25 and 29.9.  Obese: BMI of 30 or above.  Keep these notes in mind:  Weight includes both fat and muscle, so someone with a muscular build, such as an athlete, may have a BMI that is higher than 24.9. In cases like these, BMI is not an accurate measure of body fat.  To determine if excess body fat is the cause of a BMI of 25 or higher, further assessments may need to be done by a health care provider.  BMI is usually interpreted in the same way for men and women.  Where to find more information  For more information about BMI, including tools to quickly calculate your BMI, go to these websites:  Centers for Disease Control and Prevention: www.cdc.gov  American Heart Association: www.heart.org  National Heart, Lung, and Blood [place]: www.nhlbi.nih.gov  Summary  Body mass index (BMI) is a number that is calculated from a person's weight and height.  BMI may help estimate how much of a person's weight is " composed of fat. BMI can help identify those who may be at higher risk for certain medical problems.  BMI can be measured using English measurements or metric measurements.  BMI charts are used to identify whether you are underweight, normal weight, overweight, or obese.  This information is not intended to replace advice given to you by your health care provider. Make sure you discuss any questions you have with your health care provider.  Document Revised: 09/09/2020 Document Reviewed: 07/17/2020  [employer] Patient Education © 2022 [employer]  Advance Directive  Advance directives are legal documents that allow you to make decisions about your health care and medical treatment in case you become unable to communicate for yourself. Advance directives let your wishes be known to family, friends, and health care providers.  Discussing and writing advance directives should happen over time rather than all at once. Advance directives can be changed and updated at any time. There are different types of advance directives, such as:  Medical power of .  Living will.  Do not resuscitate (DNR) order or do not attempt resuscitation (DNAR) order.  Health care proxy and medical power of   A health care proxy is also called a health care agent. This person is appointed to make medical decisions for you when you are unable to make decisions for yourself. Generally, people ask a trusted friend or family member to act as their proxy and represent their preferences. Make sure you have an agreement with your trusted person to act as your proxy. A proxy may have to make a medical decision on your behalf if your wishes are not known.  A medical power of , also called a durable power of  for health care, is a legal document that names your health care proxy. Depending on the laws in your state, the document may need to be:  Signed.  Notarized.  Dated.  Copied.  Witnessed.  Incorporated into your  medical record.  You may also want to appoint a trusted person to manage your money in the event you are unable to do so. This is called a durable power of  for finances. It is a separate legal document from the durable power of  for health care. You may choose your health care proxy or someone different to act as your agent in money matters.  If you do not appoint a proxy, or there is a concern that the proxy is not acting in your best interest, a court may appoint a guardian to act on your behalf.  Living will  A living will is a set of instructions that state your wishes about medical care when you cannot express them yourself. Health care providers should keep a copy of your living will in your medical record. You may want to give a copy to family members or friends. To alert caregivers in case of an emergency, you can place a card in your wallet to let them know that you have a living will and where they can find it. A living will is used if you become:  Terminally ill.  Disabled.  Unable to communicate or make decisions.  The following decisions should be included in your living will:  To use or not to use life support equipment, such as dialysis machines and breathing machines (ventilators).  Whether you want a DNR or DNAR order. This tells health care providers not to use cardiopulmonary resuscitation (CPR) if breathing or heartbeat stops.  To use or not to use tube feeding.  To be given or not to be given food and fluids.  Whether you want comfort (palliative) care when the goal becomes comfort rather than a cure.  Whether you want to donate your organs and tissues.  A living will does not give instructions for distributing your money and property if you should pass away.  DNR or DNAR  A DNR or DNAR order is a request not to have CPR in the event that your heart stops beating or you stop breathing. If a DNR or DNAR order has not been made and shared, a health care provider will try to help  any patient whose heart has stopped or who has stopped breathing. If you plan to have surgery, talk with your health care provider about how your DNR or DNAR order will be followed if problems occur.  What if I do not have an advance directive?  Some states assign family decision makers to act on your behalf if you do not have an advance directive. Each state has its own laws about advance directives. You may want to check with your health care provider, , or state representative about the laws in your state.  Summary  Advance directives are legal documents that allow you to make decisions about your health care and medical treatment in case you become unable to communicate for yourself.  The process of discussing and writing advance directives should happen over time. You can change and update advance directives at any time.  Advance directives may include a medical power of , a living will, and a DNR or DNAR order.  This information is not intended to replace advice given to you by your health care provider. Make sure you discuss any questions you have with your health care provider.  Document Revised: 09/21/2021 Document Reviewed: 09/21/2021  Elsevier Patient Education © 2022 [employer] Inc.

## 2023-06-01 LAB
A-TOCOPHEROL VIT E SERPL-MCNC: 10.3 MG/L (ref 9–29)
GAMMA TOCOPHEROL SERPL-MCNC: 1.4 MG/L (ref 0.5–4.9)
VIT A SERPL-MCNC: 28.1 UG/DL (ref 22–69.5)

## 2023-06-02 LAB — VIT B1 BLD-SCNC: 107.2 NMOL/L (ref 66.5–200)

## 2023-06-11 DIAGNOSIS — E03.9 ACQUIRED HYPOTHYROIDISM: Primary | ICD-10-CM

## 2023-07-25 ENCOUNTER — HOSPITAL ENCOUNTER (OUTPATIENT)
Dept: CT IMAGING | Facility: HOSPITAL | Age: 63
Discharge: HOME | End: 2023-07-25
Admitting: PHYSICIAN ASSISTANT
Payer: COMMERCIAL

## 2023-07-25 DIAGNOSIS — Z12.2 SCREENING FOR LUNG CANCER: ICD-10-CM

## 2023-07-25 PROCEDURE — 71271 CT THORAX LUNG CANCER SCR C-: CPT

## 2023-09-13 ENCOUNTER — OFFICE VISIT (OUTPATIENT)
Dept: INTERNAL MEDICINE | Facility: CLINIC | Age: 63
End: 2023-09-13
Payer: MEDICARE

## 2023-09-13 VITALS
TEMPERATURE: 98.2 F | BODY MASS INDEX: 31.57 KG/M2 | SYSTOLIC BLOOD PRESSURE: 130 MMHG | WEIGHT: 167.2 LBS | HEART RATE: 78 BPM | HEIGHT: 61 IN | DIASTOLIC BLOOD PRESSURE: 90 MMHG

## 2023-09-13 DIAGNOSIS — J01.00 ACUTE NON-RECURRENT MAXILLARY SINUSITIS: ICD-10-CM

## 2023-09-13 DIAGNOSIS — B37.9 YEAST INFECTION: ICD-10-CM
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
## 2023-09-13 DIAGNOSIS — R05.1 ACUTE COUGH: ICD-10-CM

## 2023-09-13 RX ORDER — METHYLPREDNISOLONE 4 MG/1
TABLET ORAL
Qty: 21 TABLET | Refills: 0 | Status: SHIPPED | OUTPATIENT
Start: 2023-09-13

## 2023-09-13 RX ORDER — DOXYCYCLINE HYCLATE 100 MG/1
100 CAPSULE ORAL 2 TIMES DAILY
Qty: 28 CAPSULE | Refills: 0 | Status: SHIPPED | OUTPATIENT
Start: 2023-09-13 | End: 2023-09-27

## 2023-09-13 RX ORDER — FLUCONAZOLE 150 MG/1
150 TABLET ORAL ONCE
Qty: 2 TABLET | Refills: 0 | Status: SHIPPED | OUTPATIENT
Start: 2023-09-13 | End: 2023-09-13

## 2023-09-13 NOTE — PROGRESS NOTES
Office Note     Name: Aleyda Louise    : 1960     MRN: [id number]   Care Team: Patient Care Team:  Phuong Stringer PA-C as PCP - General (Internal Medicine)  Phuong Stringer PA-C as Physician Assistant (Internal Medicine)  Benito Sandoval MD (Inactive) as Consulting Physician (Gynecology)  Jarod Cavazos MD as Consulting Physician (Gastroenterology)  Wali Mercedes MD as Surgeon (Vascular Surgery)    Chief Complaint  Animal Bite    Subjective     History of Present Illness:  Aleyda Louise is a 63 y.o. female who presents today for a cat bite to her left hand.    Bruce states that a couple of days ago, she was attempting to free her cat's paw which was stuck when her cat bit her to the top of her left hand.  The area has began to swell and has redness present.  She states that her cat sees a vet regularly and is vaccinated.    Bruce also reports over the last couple of days she has experienced cough, nasal drainage and congestion, and itchy ears.  She denies fever, nausea or vomiting, diarrhea or constipation.  She denies sick contacts.    Review of Systems   HENT:  Positive for congestion, postnasal drip and rhinorrhea.    Respiratory:  Positive for cough.    Skin:         Cat bite to left hand.   All other systems reviewed and are negative.    Past Medical History:   Diagnosis Date    Allergic 20 years ago    seasonal    Allergy     Back pain     Cataract 2 yrs ago    Cholelithiasis 4 yrs ago    Gallbadder surgery    Depression 20 years ago    Disease of thyroid gland     Edema     GERD (gastroesophageal reflux disease) 20 yrs    hiatal hernia repair 5 yrs ago    Hypertension     Hypothyroidism     Low back pain 21 years ago    resolved with treatment    Menopause     Obesity 15 years ago    Gastric sleeve surgery    Restless leg syndrome        Past Surgical History:   Procedure Laterality Date    BARIATRIC SURGERY  2018    CHOLECYSTECTOMY  2014    COLONOSCOPY  3-4 years  ago    Dr Cavazos, cleared for 10 years    GASTRIC SLEEVE LAPAROSCOPIC      KNEE ARTHROSCOPY  1985    REDUCTION MAMMAPLASTY      with lift    TUMOR EXCISION      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       Social History     Socioeconomic History    Marital status:    Tobacco Use    Smoking status: Former     Packs/day: 1.00     Years: 20.00     Pack years: 20.00     Types: Cigarettes     Quit date: 2000     Years since quittin.7    Smokeless tobacco: Never   Substance and Sexual Activity    Alcohol use: Yes     Alcohol/week: 1.0 - 2.0 standard drink     Types: 1 - 2 Glasses of wine per week    Drug use: No    Sexual activity: Yes     Partners: Male     Birth control/protection: Post-menopausal         Current Outpatient Medications:     CALCIUM PO, Take 1 tablet by mouth Daily., Disp: , Rfl:     Cholecalciferol (VITAMIN D3 PO), Take 1 capsule by mouth Daily., Disp: , Rfl:     escitalopram (LEXAPRO) 10 MG tablet, TAKE 1 TABLET BY MOUTH EVERY DAY, Disp: 90 tablet, Rfl: 1    levocetirizine (XYZAL) 5 MG tablet, Take 1 tablet by mouth At Night As Needed for Allergies., Disp: , Rfl:     lisinopril (PRINIVIL,ZESTRIL) 10 MG tablet, Take 1 tablet by mouth Daily., Disp: 90 tablet, Rfl: 2    Multiple Vitamins-Minerals (MULTIVITAMIN ADULT PO), Take 1 tablet by mouth Daily., Disp: , Rfl:     omeprazole (priLOSEC) 40 MG capsule, Take 1 capsule by mouth 2 (Two) Times a Day., Disp: , Rfl:     pramipexole (MIRAPEX) 1 MG tablet, TAKE 2 TABLETS BY MOUTH EVERY NIGHT (Patient taking differently: Take 2 tablets by mouth As Needed.), Disp: 180 tablet, Rfl: 3    thyroid ([place] Thyroid) 180 MG tablet, Take 1 tablet by mouth Daily., Disp: 90 tablet, Rfl: 1    tiZANidine (ZANAFLEX) 4 MG tablet, Take 1 tablet by mouth As Needed for Muscle Spasms., Disp: , Rfl:     doxycycline (VIBRAMYCIN) 100 MG capsule, Take 1 capsule by mouth 2 (Two) Times a Day for 14 days., Disp: 28 capsule, Rfl: 0    fluconazole (Diflucan)  "150 MG tablet, Take 1 tablet by mouth 1 (One) Time for 1 dose., Disp: 2 tablet, Rfl: 0    methylPREDNISolone (MEDROL) 4 MG dose pack, Take as directed on package instructions., Disp: 21 tablet, Rfl: 0    Procedures    PHQ-9 Total Score:      Objective     Vital Signs  /90 (BP Location: Left arm, Patient Position: Sitting, Cuff Size: Adult)   Pulse 78   Temp 98.2 °F (36.8 °C) (Infrared)   Ht 156 cm (61.42\")   Wt 75.8 kg (167 lb 3.2 oz)   BMI 31.16 kg/m²   Estimated body mass index is 31.16 kg/m² as calculated from the following:    Height as of this encounter: 156 cm (61.42\").    Weight as of this encounter: 75.8 kg (167 lb 3.2 oz).            Physical Exam  Vitals and nursing note reviewed.   HENT:      Head: Normocephalic.   Cardiovascular:      Rate and Rhythm: Normal rate.   Pulmonary:      Effort: Pulmonary effort is normal.      Breath sounds: Normal breath sounds.   Musculoskeletal:      Right hand: Normal.        Arms:    Skin:     General: Skin is warm and dry.   Neurological:      General: No focal deficit present.      Mental Status: She is alert and oriented to person, place, and time.   Psychiatric:         Mood and Affect: Mood normal.         Behavior: Behavior normal.         Thought Content: Thought content normal.         Judgment: Judgment normal.        Assessment and Plan     Assessment/Plan:  Diagnoses and all orders for this visit:    1. Cat bite, initial encounter (Primary)  -     doxycycline (VIBRAMYCIN) 100 MG capsule; Take 1 capsule by mouth 2 (Two) Times a Day for 14 days.  Dispense: 28 capsule; Refill: 0  -Encouraged to monitor for worsening S/S of infection to include increase in pain, redness, swelling, warmth.  Encouraged to call provider or return to office if she notices any of these symptoms.    2. Acute non-recurrent maxillary sinusitis  -     methylPREDNISolone (MEDROL) 4 MG dose pack; Take as directed on package instructions.  Dispense: 21 tablet; Refill: 0    3. Yeast " infection  -     fluconazole (Diflucan) 150 MG tablet; Take 1 tablet by mouth 1 (One) Time for 1 dose.  Dispense: 2 tablet; Refill: 0  -Patient reports frequent yeast infections with the use of antibiotics.  Encouraged to wait until completion of antibiotics, take 1 tab then repeat in 3 days.    4. Acute cough  -     POCT SARS-CoV-2 Antigen SHANI  -     POC Rapid Strep A  -Flu/COVID/strep negative in office today.       There are no Patient Instructions on file for this visit.  Plan of care reviewed with patient at the conclusion of today's visit. Education was provided regarding diagnosis, management and any prescribed or recommended OTC medications.  Patient verbalizes understanding of and agreement with management plan.    Follow Up  Return for Next Scheduled Follow up.    Cheryl Alcaraz, APRN

## 2023-10-30 RX ORDER — THYROID, PORCINE 240 MG/1
240 TABLET ORAL DAILY
Qty: 90 TABLET | Refills: 1 | Status: SHIPPED | OUTPATIENT
Start: 2023-10-30

## 2023-10-30 RX ORDER — ESCITALOPRAM OXALATE 10 MG/1
TABLET ORAL
Qty: 90 TABLET | Refills: 1 | Status: SHIPPED | OUTPATIENT
Start: 2023-10-30

## 2023-10-30 NOTE — TELEPHONE ENCOUNTER
Rx Refill Note  Requested Prescriptions     Pending Prescriptions Disp Refills    [place] Thyroid 240 MG tablet [Pharmacy Med Name: [facility] THYROID 240 MG TABLET] 90 tablet 1     Sig: TAKE 1 TABLET BY MOUTH EVERY DAY    escitalopram (LEXAPRO) 10 MG tablet [Pharmacy Med Name: ESCITALOPRAM 10 MG TABLET] 90 tablet 1     Sig: TAKE 1 TABLET BY MOUTH EVERY DAY      Last office visit with prescribing clinician: 5/30/2023   Last telemedicine visit with prescribing clinician: Visit date not found   Next office visit with prescribing clinician: 11/30/2023                         Would you like a call back once the refill request has been completed: [] Yes [] No    If the office needs to give you a call back, can they leave a voicemail: [] Yes [] No    Jessica Freeman LPN  10/30/23, 09:28 EDT

## 2023-11-02 ENCOUNTER — TELEPHONE (OUTPATIENT)
Dept: INTERNAL MEDICINE | Facility: CLINIC | Age: 63
End: 2023-11-02
Payer: MEDICARE

## 2023-11-02 NOTE — TELEPHONE ENCOUNTER
Called Pt regarding (see previous message) pt stated she is driving to [place] and will send a picture of her prescription card through [email or web address] needing to do her Prior Authorization prescription for [place] Thyroid 240 mg.

## 2023-11-03 DIAGNOSIS — E03.9 ACQUIRED HYPOTHYROIDISM: ICD-10-CM

## 2024-04-04 DIAGNOSIS — E03.9 ACQUIRED HYPOTHYROIDISM: ICD-10-CM

## 2024-04-04 NOTE — TELEPHONE ENCOUNTER
Rx Refill Note  Requested Prescriptions     Pending Prescriptions Disp Refills    thyroid ([place] Thyroid) 180 MG tablet 90 tablet 1     Sig: Take 1 tablet by mouth Daily.      Last office visit with prescribing clinician: 5/30/2023   Last telemedicine visit with prescribing clinician: Visit date not found   Next office visit with prescribing clinician: 5/6/2024                         Would you like a call back once the refill request has been completed: [] Yes [] No    If the office needs to give you a call back, can they leave a voicemail: [] Yes [] No    Jessica Freeman LPN  04/04/24, 09:18 EDT

## 2024-05-06 ENCOUNTER — LAB (OUTPATIENT)
Dept: LAB | Facility: HOSPITAL | Age: 64
End: 2024-05-06
Payer: MEDICARE

## 2024-05-06 ENCOUNTER — OFFICE VISIT (OUTPATIENT)
Dept: INTERNAL MEDICINE | Facility: CLINIC | Age: 64
End: 2024-05-06
Payer: MEDICARE

## 2024-05-06 VITALS
TEMPERATURE: 97.8 F | OXYGEN SATURATION: 98 % | HEART RATE: 51 BPM | DIASTOLIC BLOOD PRESSURE: 94 MMHG | BODY MASS INDEX: 31.45 KG/M2 | WEIGHT: 166.6 LBS | SYSTOLIC BLOOD PRESSURE: 148 MMHG | HEIGHT: 61 IN

## 2024-05-06 DIAGNOSIS — F33.42 RECURRENT MAJOR DEPRESSIVE DISORDER, IN FULL REMISSION: ICD-10-CM

## 2024-05-06 DIAGNOSIS — E78.2 MIXED HYPERLIPIDEMIA: ICD-10-CM

## 2024-05-06 DIAGNOSIS — E53.8 VITAMIN B12 DEFICIENCY: ICD-10-CM

## 2024-05-06 DIAGNOSIS — E55.9 VITAMIN D DEFICIENCY: ICD-10-CM

## 2024-05-06 DIAGNOSIS — G25.81 RESTLESS LEG SYNDROME: ICD-10-CM

## 2024-05-06 DIAGNOSIS — I10 BENIGN ESSENTIAL HYPERTENSION: Primary | ICD-10-CM

## 2024-05-06 DIAGNOSIS — Z11.59 ENCOUNTER FOR HEPATITIS C SCREENING TEST FOR LOW RISK PATIENT: ICD-10-CM

## 2024-05-06 DIAGNOSIS — E03.9 ACQUIRED HYPOTHYROIDISM: ICD-10-CM

## 2024-05-06 DIAGNOSIS — I10 BENIGN ESSENTIAL HYPERTENSION: ICD-10-CM

## 2024-05-06 DIAGNOSIS — K21.9 GASTROESOPHAGEAL REFLUX DISEASE, UNSPECIFIED WHETHER ESOPHAGITIS PRESENT: ICD-10-CM

## 2024-05-06 LAB
BASOPHILS # BLD AUTO: 0.07 10*3/MM3 (ref 0–0.2)
BASOPHILS NFR BLD AUTO: 0.9 % (ref 0–1.5)
DEPRECATED RDW RBC AUTO: 42 FL (ref 37–54)
EOSINOPHIL # BLD AUTO: 0.43 10*3/MM3 (ref 0–0.4)
EOSINOPHIL NFR BLD AUTO: 5.7 % (ref 0.3–6.2)
ERYTHROCYTE [DISTWIDTH] IN BLOOD BY AUTOMATED COUNT: 12.7 % (ref 12.3–15.4)
HCT VFR BLD AUTO: 42.1 % (ref 34–46.6)
HGB BLD-MCNC: 13.7 G/DL (ref 12–15.9)
IMM GRANULOCYTES # BLD AUTO: 0.02 10*3/MM3 (ref 0–0.05)
IMM GRANULOCYTES NFR BLD AUTO: 0.3 % (ref 0–0.5)
LYMPHOCYTES # BLD AUTO: 2.62 10*3/MM3 (ref 0.7–3.1)
LYMPHOCYTES NFR BLD AUTO: 35 % (ref 19.6–45.3)
MCH RBC QN AUTO: 29.7 PG (ref 26.6–33)
MCHC RBC AUTO-ENTMCNC: 32.5 G/DL (ref 31.5–35.7)
MCV RBC AUTO: 91.1 FL (ref 79–97)
MONOCYTES # BLD AUTO: 0.84 10*3/MM3 (ref 0.1–0.9)
MONOCYTES NFR BLD AUTO: 11.2 % (ref 5–12)
NEUTROPHILS NFR BLD AUTO: 3.5 10*3/MM3 (ref 1.7–7)
NEUTROPHILS NFR BLD AUTO: 46.9 % (ref 42.7–76)
NRBC BLD AUTO-RTO: 0 /100 WBC (ref 0–0.2)
PLATELET # BLD AUTO: 286 10*3/MM3 (ref 140–450)
PMV BLD AUTO: 9.1 FL (ref 6–12)
RBC # BLD AUTO: 4.62 10*6/MM3 (ref 3.77–5.28)
WBC NRBC COR # BLD AUTO: 7.48 10*3/MM3 (ref 3.4–10.8)

## 2024-05-06 PROCEDURE — 3077F SYST BP >= 140 MM HG: CPT | Performed by: PHYSICIAN ASSISTANT

## 2024-05-06 PROCEDURE — 80061 LIPID PANEL: CPT

## 2024-05-06 PROCEDURE — 85025 COMPLETE CBC W/AUTO DIFF WBC: CPT

## 2024-05-06 PROCEDURE — 82306 VITAMIN D 25 HYDROXY: CPT

## 2024-05-06 PROCEDURE — 84443 ASSAY THYROID STIM HORMONE: CPT

## 2024-05-06 PROCEDURE — 99214 OFFICE O/P EST MOD 30 MIN: CPT | Performed by: PHYSICIAN ASSISTANT

## 2024-05-06 PROCEDURE — 3080F DIAST BP >= 90 MM HG: CPT | Performed by: PHYSICIAN ASSISTANT

## 2024-05-06 PROCEDURE — 80053 COMPREHEN METABOLIC PANEL: CPT

## 2024-05-06 PROCEDURE — 84481 FREE ASSAY (FT-3): CPT

## 2024-05-06 PROCEDURE — 82043 UR ALBUMIN QUANTITATIVE: CPT

## 2024-05-06 PROCEDURE — 86803 HEPATITIS C AB TEST: CPT

## 2024-05-06 PROCEDURE — 84439 ASSAY OF FREE THYROXINE: CPT

## 2024-05-06 PROCEDURE — 82607 VITAMIN B-12: CPT

## 2024-05-06 RX ORDER — OMEPRAZOLE 40 MG/1
40 CAPSULE, DELAYED RELEASE ORAL 2 TIMES DAILY
Qty: 180 CAPSULE | Refills: 1 | Status: SHIPPED | OUTPATIENT
Start: 2024-05-06

## 2024-05-06 RX ORDER — LISINOPRIL 10 MG/1
10 TABLET ORAL DAILY
Qty: 90 TABLET | Refills: 2 | Status: SHIPPED | OUTPATIENT
Start: 2024-05-06

## 2024-05-07 LAB
25(OH)D3 SERPL-MCNC: 77 NG/ML (ref 30–100)
ALBUMIN SERPL-MCNC: 4.4 G/DL (ref 3.5–5.2)
ALBUMIN UR-MCNC: <1.2 MG/DL
ALBUMIN/GLOB SERPL: 1.6 G/DL
ALP SERPL-CCNC: 110 U/L (ref 39–117)
ALT SERPL W P-5'-P-CCNC: 10 U/L (ref 1–33)
ANION GAP SERPL CALCULATED.3IONS-SCNC: 9 MMOL/L (ref 5–15)
AST SERPL-CCNC: 15 U/L (ref 1–32)
BILIRUB SERPL-MCNC: 0.5 MG/DL (ref 0–1.2)
BUN SERPL-MCNC: 21 MG/DL (ref 8–23)
BUN/CREAT SERPL: 28.4 (ref 7–25)
CALCIUM SPEC-SCNC: 9.6 MG/DL (ref 8.6–10.5)
CHLORIDE SERPL-SCNC: 104 MMOL/L (ref 98–107)
CHOLEST SERPL-MCNC: 244 MG/DL (ref 0–200)
CO2 SERPL-SCNC: 28 MMOL/L (ref 22–29)
CREAT SERPL-MCNC: 0.74 MG/DL (ref 0.57–1)
EGFRCR SERPLBLD CKD-EPI 2021: 89.9 ML/MIN/1.73
GLOBULIN UR ELPH-MCNC: 2.7 GM/DL
GLUCOSE SERPL-MCNC: 81 MG/DL (ref 65–99)
HCV AB SER QL: NORMAL
HDLC SERPL-MCNC: 78 MG/DL (ref 40–60)
LDLC SERPL CALC-MCNC: 150 MG/DL (ref 0–100)
LDLC/HDLC SERPL: 1.89 {RATIO}
POTASSIUM SERPL-SCNC: 4.7 MMOL/L (ref 3.5–5.2)
PROT SERPL-MCNC: 7.1 G/DL (ref 6–8.5)
SODIUM SERPL-SCNC: 141 MMOL/L (ref 136–145)
T3FREE SERPL-MCNC: 9.53 PG/ML (ref 2–4.4)
T4 FREE SERPL-MCNC: 1.09 NG/DL (ref 0.93–1.7)
TRIGL SERPL-MCNC: 91 MG/DL (ref 0–150)
TSH SERPL DL<=0.05 MIU/L-ACNC: 1.97 UIU/ML (ref 0.27–4.2)
VIT B12 BLD-MCNC: 506 PG/ML (ref 211–946)
VLDLC SERPL-MCNC: 16 MG/DL (ref 5–40)

## 2024-06-18 ENCOUNTER — TELEPHONE (OUTPATIENT)
Dept: INTERNAL MEDICINE | Facility: CLINIC | Age: 64
End: 2024-06-18
Payer: MEDICARE

## 2024-06-18 NOTE — TELEPHONE ENCOUNTER
Caller: BHARATI FROM [facility] OFFICE    Best call back number: [phone]    What form or medical record are you requesting: CALLED TO REQUEST THE PT MEDICAL CLEARANCE  AND MOST RECENT OFFICE VISIT NOTES PT HAS SURGERY SCHEDULED FOR 6/25 AND NEEDS INFORMATION PRIOR, ALSO STATED THAT REQUEST WAS FAXED OVER TO OFFICE ON 6/7

## 2024-06-19 NOTE — TELEPHONE ENCOUNTER
I spoke to patient she stated she lives in [place] now so she can't come in for another visit. She had the EKG done down there and printed her last labs she had done here. So I told her that you couldn't give her clearance without being seen that we can fax her last OV note to them and she can give them what she has and see what they say she said she will do that.    Front office could you please send OV note to [facility] in [facility]?

## 2024-06-19 NOTE — TELEPHONE ENCOUNTER
Patient called stating she have wrong location previously for her paperwork she said the correct location is the office of Orthopedic Dr. Dior  [address] [place] [phone]   Fax # [phone]

## 2024-09-16 RX ORDER — ESCITALOPRAM OXALATE 10 MG/1
TABLET ORAL
Qty: 90 TABLET | Refills: 1 | Status: SHIPPED | OUTPATIENT
Start: 2024-09-16

## 2025-02-05 DIAGNOSIS — I10 BENIGN ESSENTIAL HYPERTENSION: ICD-10-CM

## 2025-02-05 RX ORDER — LISINOPRIL 10 MG/1
10 TABLET ORAL DAILY
Qty: 90 TABLET | Refills: 2 | Status: SHIPPED | OUTPATIENT
Start: 2025-02-05

## 2025-02-05 NOTE — TELEPHONE ENCOUNTER
Rx Refill Note  Requested Prescriptions     Pending Prescriptions Disp Refills    lisinopril (PRINIVIL,ZESTRIL) 10 MG tablet [Pharmacy Med Name: LISINOPRIL 10 MG TABLET] 90 tablet 2     Sig: TAKE 1 TABLET BY MOUTH EVERY DAY      Last office visit with prescribing clinician: 5/6/2024   Last telemedicine visit with prescribing clinician: Visit date not found   Next office visit with prescribing clinician: Visit date not found                         Would you like a call back once the refill request has been completed: [] Yes [] No    If the office needs to give you a call back, can they leave a voicemail: [] Yes [] No    Jessica Freeman LPN  02/05/25, 08:24 EST

## 2025-07-24 DIAGNOSIS — E03.9 ACQUIRED HYPOTHYROIDISM: ICD-10-CM

## 2025-07-24 RX ORDER — THYROID 180 MG
180 TABLET ORAL DAILY
Qty: 90 TABLET | Refills: 1 | Status: SHIPPED | OUTPATIENT
Start: 2025-07-24

## 2025-07-24 NOTE — TELEPHONE ENCOUNTER
Rx Refill Note  Requested Prescriptions     Pending Prescriptions Disp Refills    [place]mar[place] Thyroid 180 MG tablet [Pharmacy Med Name: ROSEMARIE THYROID 180 MG TABLET] 90 tablet 1     Sig: TAKE 1 TABLET BY MOUTH EVERY DAY      Last office visit with prescribing clinician:  5/6/24  Last telemedicine visit with prescribing clinician: Visit date not found   Next office visit with prescribing clinician: Visit date not found                         Would you like a call back once the refill request has been completed: [] Yes [] No    If the office needs to give you a call back, can they leave a voicemail: [] Yes [] No    Cheryl Oh RN  07/24/25, 11:25 EDT